# Patient Record
Sex: MALE | Race: WHITE | Employment: OTHER | URBAN - METROPOLITAN AREA
[De-identification: names, ages, dates, MRNs, and addresses within clinical notes are randomized per-mention and may not be internally consistent; named-entity substitution may affect disease eponyms.]

---

## 2017-08-18 LAB
DLCO: NORMAL
FEV1/FVC: NORMAL
FEV1: NORMAL
FVC: NORMAL
TLC: NORMAL

## 2020-01-20 LAB
ALBUMIN SERPL-MCNC: 4.4 G/DL
ALP BLD-CCNC: 75 U/L
ALT SERPL-CCNC: 32 U/L
ANION GAP SERPL CALCULATED.3IONS-SCNC: NORMAL MMOL/L
AST SERPL-CCNC: 19 U/L
BILIRUB SERPL-MCNC: 0.4 MG/DL (ref 0.1–1.4)
BUN BLDV-MCNC: 19 MG/DL
CALCIUM SERPL-MCNC: 9.4 MG/DL
CHLORIDE BLD-SCNC: 99 MMOL/L
CO2: 25 MMOL/L
CREAT SERPL-MCNC: 1.03 MG/DL
GFR CALCULATED: 78
GLUCOSE BLD-MCNC: 93 MG/DL
HCT VFR BLD CALC: 43.8 % (ref 41–53)
HEMOGLOBIN: 15 G/DL (ref 13.5–17.5)
PLATELET # BLD: 258 K/ΜL
POTASSIUM SERPL-SCNC: 4.4 MMOL/L
SODIUM BLD-SCNC: 140 MMOL/L
TOTAL PROTEIN: 7.5
WBC # BLD: 11.4 10^3/ML

## 2020-02-14 LAB
SEDIMENTATION RATE, ERYTHROCYTE: 15
T4 FREE: 8.6
TSH SERPL DL<=0.05 MIU/L-ACNC: 3.03 UIU/ML

## 2020-11-11 ASSESSMENT — ENCOUNTER SYMPTOMS
CONSTIPATION: 0
PHOTOPHOBIA: 0
EYE REDNESS: 0
SORE THROAT: 0
VOMITING: 0
NAUSEA: 0
DIARRHEA: 0
ABDOMINAL PAIN: 0
BLOOD IN STOOL: 0

## 2020-11-11 NOTE — PROGRESS NOTES
2020    Chief Complaint   Patient presents with   Lucy Mcginnis New Doctor    Referral - General     previous pulmonologist no longer practicing.  URI     possible sinus infection, started last night denies covid symptoms-lightheaded, some sob but has asthma and history of bronchitis      HPI  Ramone Camara (:  1960) is a 56 y.o. male, here for evaluation of the following medical concerns:    Asthma: Reports his previous pulmonologist retired. Used to smoke mildly. Usually does well. Reports that he has been having a flare up right now. Patient has been using his rescue inhaler and nebulizer. Reports hx of recurrent PNA and bronchitis. Feels short of breath, + cough. Reports he usually gets steroids and a zpak for this issue. GERD: Follows with GI for this issues. Protonix 40mg daily. Has not had an upper GI, but is due for a colonoscopy. Previously no issues. Headache: Has seen several specialist without relief. Dx as tension HA. No recent change. Low back pain/Neck pain: Has seen pain management, chiropractic care. Has not completed PT. Flares every few months. No red flag symptoms. 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 Reports he had a tetanus booster this year. Review of Systems   Constitutional: Positive for chills and fatigue. Negative for fever. HENT: Positive for congestion, postnasal drip and rhinorrhea. Negative for hearing loss, sneezing, sore throat and tinnitus. Eyes: Negative for photophobia and redness. Respiratory: Positive for cough, shortness of breath and wheezing. Cardiovascular: Negative for chest pain, palpitations and leg swelling. Gastrointestinal: Negative for abdominal pain, blood in stool, constipation, diarrhea, nausea and vomiting. Endocrine: Negative for polydipsia and polyuria. Genitourinary: Negative for difficulty urinating, dysuria, frequency and hematuria.    Musculoskeletal: Positive for arthralgias, back pain and myalgias. Skin: Negative for rash. Neurological: Positive for weakness and headaches. Negative for dizziness, syncope, light-headedness and numbness. Psychiatric/Behavioral: The patient is not nervous/anxious. Prior to Visit Medications    Medication Sig Taking? Authorizing Provider   montelukast (SINGULAIR) 10 MG tablet Take 10 mg by mouth nightly Yes Historical Provider, MD   pantoprazole (PROTONIX) 40 MG tablet Take 40 mg by mouth daily Yes Historical Provider, MD   albuterol sulfate HFA (VENTOLIN HFA) 108 (90 Base) MCG/ACT inhaler Inhale 2 puffs into the lungs every 6 hours as needed for Wheezing Yes Historical Provider, MD   budesonide-formoterol (SYMBICORT) 160-4.5 MCG/ACT AERO Inhale 2 puffs into the lungs 2 times daily Yes Martha Altamirano MD   azithromycin (ZITHROMAX) 250 MG tablet Take 1 tablet by mouth See Admin Instructions for 5 days 500mg on day 1 followed by 250mg on days 2 - 5 Yes Martha Altamirano MD   methylPREDNISolone (MEDROL, LEONARDO,) 4 MG tablet Take by mouth. Yes Martha Altamirano MD   Cholecalciferol 100 MCG (4000 UT) CAPS Take 4,000 Units by mouth daily Yes Martha Altamirano MD   Magnesium 400 MG CAPS Take 400 mg by mouth daily Yes Martha Altamirano MD        Allergies   Allergen Reactions    Aspirin Shortness Of Breath    Nsaids     Shellfish-Derived Products        Past Medical History:   Diagnosis Date    Asthma     GERD (gastroesophageal reflux disease)     Headache           Past Surgical History:   Procedure Laterality Date    HERNIA REPAIR  2003    KNEE ARTHROPLASTY      Left Knee Scopes       Family History   Problem Relation Age of Onset    Cancer Father         Lung Ca         There is no immunization history on file for this patient.     Health Maintenance   Topic Date Due    Hepatitis C screen  1960    HIV screen  07/06/1973    DTaP/Tdap/Td vaccine (1 - Tdap) 07/06/1977    Lipid screen  07/06/1998    Diabetes screen 07/06/1998    Shingles Vaccine (1 of 2) 07/06/2008    Colon cancer screen colonoscopy  07/06/2008    Flu vaccine (1) 09/01/2020    Hepatitis A vaccine  Aged Out    Hepatitis B vaccine  Aged Out    Hib vaccine  Aged Out    Meningococcal (ACWY) vaccine  Aged Out    Pneumococcal 0-64 years Vaccine  Aged Out       Social History     Socioeconomic History    Marital status: Single     Spouse name: Not on file    Number of children: Not on file    Years of education: Not on file    Highest education level: Not on file   Occupational History    Not on file   Social Needs    Financial resource strain: Not on file    Food insecurity     Worry: Not on file     Inability: Not on file   [employer] needs     Medical: Not on file     Non-medical: Not on file   Tobacco Use    Smoking status: Never Smoker    Smokeless tobacco: Never Used   Substance and Sexual Activity    Alcohol use: Yes     Frequency: Never     Drinks per session: 1 or 2     Binge frequency: Never    Drug use: Never    Sexual activity: Not Currently     Partners: Female   Lifestyle    Physical activity     Days per week: Not on file     Minutes per session: Not on file    Stress: Not on file   Relationships    Social connections     Talks on phone: Not on file     Gets together: Not on file     Attends Mosque service: Not on file     Active member of club or organization: Not on file     Attends meetings of clubs or organizations: Not on file     Relationship status: Not on file    Intimate partner violence     Fear of current or ex partner: Not on file     Emotionally abused: Not on file     Physically abused: Not on file     Forced sexual activity: Not on file   Other Topics Concern    Not on file   Social History Narrative    Not on file         Vitals:    11/12/20 1002   BP: 118/70   Pulse: 82   Resp: 18   Temp: 97.6 °F (36.4 °C)   TempSrc: Temporal   SpO2: 95%   Weight: 220 lb (99.8 kg)   Height: 6' 1.5\" (1.867 m) Estimated body mass index is 28.63 kg/m² as calculated from the following:    Height as of this encounter: 6' 1.5\" (1.867 m). Weight as of this encounter: 220 lb (99.8 kg). Physical Exam  Vitals signs and nursing note reviewed. Constitutional:       Appearance: He is well-developed. HENT:      Head: Normocephalic. Right Ear: External ear normal.      Left Ear: External ear normal.   Eyes:      Extraocular Movements: Extraocular movements intact. Conjunctiva/sclera: Conjunctivae normal.      Pupils: Pupils are equal, round, and reactive to light. Neck:      Musculoskeletal: Neck supple. Cardiovascular:      Rate and Rhythm: Normal rate and regular rhythm. Pulses:           Radial pulses are 2+ on the right side and 2+ on the left side. Posterior tibial pulses are 2+ on the right side and 2+ on the left side. Heart sounds: Normal heart sounds. No murmur. Pulmonary:      Effort: Pulmonary effort is normal. No respiratory distress. Breath sounds: Wheezing and rhonchi present. No decreased breath sounds or rales. Abdominal:      General: Bowel sounds are normal. There is no distension. Palpations: Abdomen is soft. Tenderness: There is no abdominal tenderness. There is no rebound. Musculoskeletal: Normal range of motion. Right lower leg: No edema. Left lower leg: No edema. Skin:     General: Skin is warm and dry. Findings: No rash. Neurological:      Mental Status: He is alert and oriented to person, place, and time. Cranial Nerves: No cranial nerve deficit. Sensory: No sensory deficit. Deep Tendon Reflexes:      Reflex Scores:       Patellar reflexes are 2+ on the right side and 2+ on the left side.   Psychiatric:         Behavior: Behavior normal.         Patient Active Problem List    Diagnosis Date Noted    Moderate persistent asthma with acute exacerbation 11/12/2020    Gastroesophageal reflux disease 11/12/2020  Overweight (BMI 25.0-29.9) 11/12/2020    Cervicalgia 11/12/2020    Chronic bilateral low back pain 11/12/2020    Vitamin D deficiency 11/12/2020    Tension headache, chronic 11/12/2020         ASSESSMENT/PLAN:  Lennox Mars was seen today for established new doctor, referral - general and uri. Diagnoses and all orders for this visit:    Establishing care with new doctor, encounter for  Comments:  Need previous med recs. Will address flu and pna shot at next apt when improved. Will obtain previuos labs. Orders:  -     Cancel: CBC Auto Differential; Future  -     Cancel: Comprehensive Metabolic Panel; Future  -     Cancel: Lipid Panel; Future  -     Cancel: TSH without Reflex; Future    Moderate persistent asthma with acute exacerbation  Comments:  Cont current meds. Treat for actue exacerbation. Orders:  -     [facility] ([facility]) - Pecalex Mathew G,DO, Pulmonary, [place]  -     budesonide-formoterol (SYMBICORT) 160-4.5 MCG/ACT AERO; Inhale 2 puffs into the lungs 2 times daily  -     azithromycin (ZITHROMAX) 250 MG tablet; Take 1 tablet by mouth See Admin Instructions for 5 days 500mg on day 1 followed by 250mg on days 2 - 5  -     methylPREDNISolone (MEDROL, LEONARDO,) 4 MG tablet; Take by mouth.  -     COVID-19 Ambulatory; Future    Gastroesophageal reflux disease, unspecified whether esophagitis present  Comments:  Stable on PPI. Follows with GI. Overweight (BMI 25.0-29. 9)  Comments:  Healthy lifestyle modifications. Vitamin D deficiency  Comments:  Start 4,000 IU of Vit D w/ Vit K2 daily. Orders:  -     Cholecalciferol 100 MCG (4000 UT) CAPS; Take 4,000 Units by mouth daily    Chronic bilateral low back pain, unspecified whether sciatica present  Comments:  Cont w/ chiropractic care. Has seen pain mngt. Will obtain previous records. Consider PT    Cervicalgia  Comments:  Cont w/ chiropractic care. Has seen pain mngt. Will obtain previous records.  Consider PT    Encounter for screening laboratory testing for COVID-19 virus  -     COVID-19 Ambulatory; Future    Chronic tension-type headache, not intractable  Comments:  Trial of Magnesium 400-600mg qhs. Orders:  -     Magnesium 400 MG CAPS; Take 400 mg by mouth daily      Health Maintenance reviewed - Flu and PNA next apt. Review previous medical records and labs. Then f/u q6 months with labs yearly. Return in about 4 weeks (around 12/10/2020) for To Discuss Labs Results, Routine Follow-up of Chronic Conditions. Educational materials and/or home exercises printed for patient's review and were included in patient instructions on his/her After Visit Summary and given to patient at the end of visit.       Counseled regarding above diagnosis, including possible risks and complications,  especially if left uncontrolled.     Counseled regarding the possible side effects, risks, benefits and alternatives to treatment; patient and/or guardianverbalizes understanding, agrees, feels comfortable with and wishes to proceed with above treatment plan.     Advised patient to call with any new medication issues, and read all Rx info from pharmacy to assure aware of all possible risks and side effects of medication before taking.     Reviewed age and gender appropriate health screening exams and vaccinations. Advised patient regarding importance of keeping up withrecommended health maintenance and to schedule as soon as possible if overdue, as this is important in assessing for undiagnosed pathology, especially cancer, as well as protecting against potentially harmful/lifethreatening disease.       Patient and/or guardian verbalizes understanding and agrees with abovecounseling, assessment and plan.     All questions answered. An  electronic signature was used to authenticatethis note.     --Jade Rod MD on 11/11/20 at 3:43 PM EST

## 2020-11-12 ENCOUNTER — OFFICE VISIT (OUTPATIENT)
Dept: PRIMARY CARE CLINIC | Age: 60
End: 2020-11-12
Payer: COMMERCIAL

## 2020-11-12 VITALS
RESPIRATION RATE: 18 BRPM | WEIGHT: 220 LBS | TEMPERATURE: 97.6 F | BODY MASS INDEX: 28.23 KG/M2 | HEIGHT: 74 IN | SYSTOLIC BLOOD PRESSURE: 118 MMHG | DIASTOLIC BLOOD PRESSURE: 70 MMHG | HEART RATE: 82 BPM | OXYGEN SATURATION: 95 %

## 2020-11-12 DIAGNOSIS — J45.41 MODERATE PERSISTENT ASTHMA WITH ACUTE EXACERBATION: ICD-10-CM

## 2020-11-12 DIAGNOSIS — Z20.822 ENCOUNTER FOR SCREENING LABORATORY TESTING FOR COVID-19 VIRUS: ICD-10-CM

## 2020-11-12 PROBLEM — E55.9 VITAMIN D DEFICIENCY: Status: ACTIVE | Noted: 2020-11-12

## 2020-11-12 PROBLEM — G44.229 TENSION HEADACHE, CHRONIC: Status: ACTIVE | Noted: 2020-11-12

## 2020-11-12 PROBLEM — M54.2 CERVICALGIA: Status: ACTIVE | Noted: 2020-11-12

## 2020-11-12 PROBLEM — E66.3 OVERWEIGHT (BMI 25.0-29.9): Status: ACTIVE | Noted: 2020-11-12

## 2020-11-12 PROBLEM — K21.9 GASTROESOPHAGEAL REFLUX DISEASE: Status: ACTIVE | Noted: 2020-11-12

## 2020-11-12 PROBLEM — G89.29 CHRONIC BILATERAL LOW BACK PAIN: Status: ACTIVE | Noted: 2020-11-12

## 2020-11-12 PROBLEM — M54.50 CHRONIC BILATERAL LOW BACK PAIN: Status: ACTIVE | Noted: 2020-11-12

## 2020-11-12 PROCEDURE — 99204 OFFICE O/P NEW MOD 45 MIN: CPT | Performed by: FAMILY MEDICINE

## 2020-11-12 RX ORDER — BUDESONIDE AND FORMOTEROL FUMARATE DIHYDRATE 160; 4.5 UG/1; UG/1
2 AEROSOL RESPIRATORY (INHALATION) 2 TIMES DAILY
COMMUNITY
End: 2020-11-12 | Stop reason: SDUPTHER

## 2020-11-12 RX ORDER — ALBUTEROL SULFATE 90 UG/1
2 AEROSOL, METERED RESPIRATORY (INHALATION) EVERY 6 HOURS PRN
COMMUNITY
End: 2020-12-10 | Stop reason: SDUPTHER

## 2020-11-12 RX ORDER — MONTELUKAST SODIUM 10 MG/1
10 TABLET ORAL NIGHTLY
COMMUNITY
End: 2020-12-10 | Stop reason: SDUPTHER

## 2020-11-12 RX ORDER — PANTOPRAZOLE SODIUM 40 MG/1
40 TABLET, DELAYED RELEASE ORAL DAILY
COMMUNITY
End: 2022-10-26 | Stop reason: SDUPTHER

## 2020-11-12 RX ORDER — METHYLPREDNISOLONE 4 MG/1
TABLET ORAL
Qty: 1 KIT | Refills: 0 | Status: SHIPPED
Start: 2020-11-12 | End: 2020-11-23 | Stop reason: ALTCHOICE

## 2020-11-12 RX ORDER — BUDESONIDE AND FORMOTEROL FUMARATE DIHYDRATE 160; 4.5 UG/1; UG/1
2 AEROSOL RESPIRATORY (INHALATION) 2 TIMES DAILY
Qty: 1 INHALER | Refills: 3 | Status: SHIPPED
Start: 2020-11-12 | End: 2020-12-10 | Stop reason: SDUPTHER

## 2020-11-12 RX ORDER — AZITHROMYCIN 250 MG/1
250 TABLET, FILM COATED ORAL SEE ADMIN INSTRUCTIONS
Qty: 6 TABLET | Refills: 0 | Status: SHIPPED | OUTPATIENT
Start: 2020-11-12 | End: 2020-11-17

## 2020-11-12 SDOH — HEALTH STABILITY: MENTAL HEALTH: HOW OFTEN DO YOU HAVE A DRINK CONTAINING ALCOHOL?: NEVER

## 2020-11-12 SDOH — HEALTH STABILITY: MENTAL HEALTH: HOW MANY STANDARD DRINKS CONTAINING ALCOHOL DO YOU HAVE ON A TYPICAL DAY?: 1 OR 2

## 2020-11-12 ASSESSMENT — PATIENT HEALTH QUESTIONNAIRE - PHQ9
SUM OF ALL RESPONSES TO PHQ9 QUESTIONS 1 & 2: 0
SUM OF ALL RESPONSES TO PHQ QUESTIONS 1-9: 0
SUM OF ALL RESPONSES TO PHQ QUESTIONS 1-9: 0
1. LITTLE INTEREST OR PLEASURE IN DOING THINGS: 0
2. FEELING DOWN, DEPRESSED OR HOPELESS: 0
SUM OF ALL RESPONSES TO PHQ QUESTIONS 1-9: 0

## 2020-11-12 ASSESSMENT — ENCOUNTER SYMPTOMS
RHINORRHEA: 1
BACK PAIN: 1
WHEEZING: 1
COUGH: 1
SHORTNESS OF BREATH: 1

## 2020-11-14 LAB
SARS-COV-2: DETECTED
SOURCE: ABNORMAL

## 2020-11-16 ENCOUNTER — APPOINTMENT (OUTPATIENT)
Dept: GENERAL RADIOLOGY | Age: 60
End: 2020-11-16
Payer: COMMERCIAL

## 2020-11-16 ENCOUNTER — HOSPITAL ENCOUNTER (EMERGENCY)
Age: 60
Discharge: HOME | End: 2020-11-17
Attending: EMERGENCY MEDICINE
Payer: COMMERCIAL

## 2020-11-16 PROCEDURE — 71045 X-RAY EXAM CHEST 1 VIEW: CPT

## 2020-11-16 PROCEDURE — 99283 EMERGENCY DEPT VISIT LOW MDM: CPT | Performed by: EMERGENCY MEDICINE

## 2020-11-16 RX ORDER — SODIUM CHLORIDE 0.9 % (FLUSH) 0.9 %
10 SYRINGE (ML) INJECTION PRN
Status: DISCONTINUED | OUTPATIENT
Start: 2020-11-16 | End: 2020-11-17 | Stop reason: HOSPADM

## 2020-11-16 ASSESSMENT — ENCOUNTER SYMPTOMS
SORE THROAT: 0
COUGH: 1
BACK PAIN: 0
VOMITING: 0
SHORTNESS OF BREATH: 1
NAUSEA: 0
SPUTUM PRODUCTION: 0
ABDOMINAL PAIN: 0
EYE REDNESS: 0
EYE PAIN: 0
EYE DISCHARGE: 0
SINUS PRESSURE: 0
DIARRHEA: 0
WHEEZING: 0
SWOLLEN GLANDS: 0

## 2020-11-17 ENCOUNTER — TELEPHONE (OUTPATIENT)
Dept: PRIMARY CARE CLINIC | Age: 60
End: 2020-11-17

## 2020-11-17 VITALS
SYSTOLIC BLOOD PRESSURE: 127 MMHG | HEART RATE: 82 BPM | DIASTOLIC BLOOD PRESSURE: 69 MMHG | TEMPERATURE: 97.9 F | BODY MASS INDEX: 27.98 KG/M2 | OXYGEN SATURATION: 95 % | WEIGHT: 218 LBS | RESPIRATION RATE: 16 BRPM | HEIGHT: 74 IN

## 2020-11-17 LAB
ANION GAP SERPL CALCULATED.3IONS-SCNC: 10 MMOL/L (ref 7–16)
ATYPICAL LYMPHOCYTE RELATIVE PERCENT: 4.3 % (ref 0–4)
BASOPHILS ABSOLUTE: 0 E9/L (ref 0–0.2)
BASOPHILS RELATIVE PERCENT: 0 % (ref 0–2)
BUN BLDV-MCNC: 20 MG/DL (ref 8–23)
CALCIUM SERPL-MCNC: 8.8 MG/DL (ref 8.6–10.2)
CHLORIDE BLD-SCNC: 95 MMOL/L (ref 98–107)
CO2: 28 MMOL/L (ref 22–29)
CREAT SERPL-MCNC: 1.2 MG/DL (ref 0.7–1.2)
D DIMER: 241 NG/ML DDU
EKG ATRIAL RATE: 77 BPM
EKG P AXIS: 64 DEGREES
EKG P-R INTERVAL: 164 MS
EKG Q-T INTERVAL: 366 MS
EKG QRS DURATION: 86 MS
EKG QTC CALCULATION (BAZETT): 414 MS
EKG R AXIS: 77 DEGREES
EKG T AXIS: 61 DEGREES
EKG VENTRICULAR RATE: 77 BPM
EOSINOPHILS ABSOLUTE: 0 E9/L (ref 0.05–0.5)
EOSINOPHILS RELATIVE PERCENT: 0 % (ref 0–6)
GFR AFRICAN AMERICAN: >60
GFR NON-AFRICAN AMERICAN: >60 ML/MIN/1.73
GLUCOSE BLD-MCNC: 97 MG/DL (ref 74–99)
HCT VFR BLD CALC: 47.2 % (ref 37–54)
HEMOGLOBIN: 15.5 G/DL (ref 12.5–16.5)
LYMPHOCYTES ABSOLUTE: 1.64 E9/L (ref 1.5–4)
LYMPHOCYTES RELATIVE PERCENT: 21.7 % (ref 20–42)
MCH RBC QN AUTO: 31.4 PG (ref 26–35)
MCHC RBC AUTO-ENTMCNC: 32.8 % (ref 32–34.5)
MCV RBC AUTO: 95.7 FL (ref 80–99.9)
MONOCYTES ABSOLUTE: 0.44 E9/L (ref 0.1–0.95)
MONOCYTES RELATIVE PERCENT: 7 % (ref 2–12)
NEUTROPHILS ABSOLUTE: 4.22 E9/L (ref 1.8–7.3)
NEUTROPHILS RELATIVE PERCENT: 67 % (ref 43–80)
NUCLEATED RED BLOOD CELLS: 0 /100 WBC
PDW BLD-RTO: 12 FL (ref 11.5–15)
PLATELET # BLD: 162 E9/L (ref 130–450)
PMV BLD AUTO: 9.2 FL (ref 7–12)
POTASSIUM SERPL-SCNC: 4.6 MMOL/L (ref 3.5–5)
PRO-BNP: 63 PG/ML (ref 0–125)
RBC # BLD: 4.93 E12/L (ref 3.8–5.8)
SODIUM BLD-SCNC: 133 MMOL/L (ref 132–146)
TROPONIN: <0.01 NG/ML (ref 0–0.03)
WBC # BLD: 6.3 E9/L (ref 4.5–11.5)

## 2020-11-17 PROCEDURE — 85378 FIBRIN DEGRADE SEMIQUANT: CPT

## 2020-11-17 PROCEDURE — 83880 ASSAY OF NATRIURETIC PEPTIDE: CPT

## 2020-11-17 PROCEDURE — 80048 BASIC METABOLIC PNL TOTAL CA: CPT

## 2020-11-17 PROCEDURE — 93005 ELECTROCARDIOGRAM TRACING: CPT | Performed by: EMERGENCY MEDICINE

## 2020-11-17 PROCEDURE — 85025 COMPLETE CBC W/AUTO DIFF WBC: CPT

## 2020-11-17 PROCEDURE — 84484 ASSAY OF TROPONIN QUANT: CPT

## 2020-11-17 PROCEDURE — 93010 ELECTROCARDIOGRAM REPORT: CPT | Performed by: INTERNAL MEDICINE

## 2020-11-17 NOTE — ED PROVIDER NOTES
breath sounds. No decreased breath sounds, wheezing or rhonchi. Abdominal:      General: Bowel sounds are normal.      Palpations: Abdomen is soft. There is no hepatomegaly or mass. Musculoskeletal: Normal range of motion. Right lower leg: He exhibits no tenderness. No edema. Left lower leg: He exhibits no tenderness. No edema. Skin:     General: Skin is warm. Capillary Refill: Capillary refill takes less than 2 seconds. Neurological:      General: No focal deficit present. Mental Status: He is alert and oriented to person, place, and time. Procedures     MDM  Number of Diagnoses or Management Options  COVID-19:   Shortness of breath:   Diagnosis management comments: Patient seen and examined. Labs and imaging were ordered. D-dimer was reassuring and was not felt a CTA of the chest was warranted. Patient never was hypoxic in the emergency department he does still complain of shortness of breath but likely related to his COVID-19. He is already on multiple medications that are appropriate for treatment for COVID-19 and at this point do not feel further intervention was warranted and patient can be discharged home with outpatient follow-up. He was given reasons to return to the emergency department and stated understanding.             --------------------------------------------- PAST HISTORY ---------------------------------------------  Past Medical History:  has a past medical history of Asthma, GERD (gastroesophageal reflux disease), and Headache. Past Surgical History:  has a past surgical history that includes Knee Arthroplasty and hernia repair (2003). Social History:  reports that he has never smoked. He has never used smokeless tobacco. He reports current alcohol use. He reports that he does not use drugs. Family History: family history includes Cancer in his father. The patients home medications have been reviewed.     Allergies: Aspirin; Nsaids; and Shellfish-derived products    -------------------------------------------------- RESULTS -------------------------------------------------  Labs:  Results for orders placed or performed during the hospital encounter of 11/16/20   CBC Auto Differential   Result Value Ref Range    WBC 6.3 4.5 - 11.5 E9/L    RBC 4.93 3.80 - 5.80 E12/L    Hemoglobin 15.5 12.5 - 16.5 g/dL    Hematocrit 47.2 37.0 - 54.0 %    MCV 95.7 80.0 - 99.9 fL    MCH 31.4 26.0 - 35.0 pg    MCHC 32.8 32.0 - 34.5 %    RDW 12.0 11.5 - 15.0 fL    Platelets 991 157 - 543 E9/L    MPV 9.2 7.0 - 12.0 fL    Neutrophils % 67.0 43.0 - 80.0 %    Lymphocytes % 21.7 20.0 - 42.0 %    Monocytes % 7.0 2.0 - 12.0 %    Eosinophils % 0.0 0.0 - 6.0 %    Basophils % 0.0 0.0 - 2.0 %    Neutrophils Absolute 4.22 1.80 - 7.30 E9/L    Lymphocytes Absolute 1.64 1.50 - 4.00 E9/L    Monocytes Absolute 0.44 0.10 - 0.95 E9/L    Eosinophils Absolute 0.00 (L) 0.05 - 0.50 E9/L    Basophils Absolute 0.00 0.00 - 0.20 E9/L    Atypical Lymphocytes Relative 4.3 (H) 0.0 - 4.0 %    nRBC 0.0 /100 WBC   Basic Metabolic Panel   Result Value Ref Range    Sodium 133 132 - 146 mmol/L    Potassium 4.6 3.5 - 5.0 mmol/L    Chloride 95 (L) 98 - 107 mmol/L    CO2 28 22 - 29 mmol/L    Anion Gap 10 7 - 16 mmol/L    Glucose 97 74 - 99 mg/dL    BUN 20 8 - 23 mg/dL    CREATININE 1.2 0.7 - 1.2 mg/dL    GFR Non-African American >60 >=60 mL/min/1.73    GFR African American >60     Calcium 8.8 8.6 - 10.2 mg/dL   Troponin   Result Value Ref Range    Troponin <0.01 0.00 - 0.03 ng/mL   Brain Natriuretic Peptide   Result Value Ref Range    Pro-BNP 63 0 - 125 pg/mL   D-Dimer, Quantitative   Result Value Ref Range    D-Dimer, Quant 241 ng/mL DDU   EKG 12 Lead   Result Value Ref Range    Ventricular Rate 77 BPM    Atrial Rate 77 BPM    P-R Interval 164 ms    QRS Duration 86 ms    Q-T Interval 366 ms    QTc Calculation (Bazett) 414 ms    P Axis 64 degrees    R Axis 77 degrees    T Axis 61 degrees       Radiology:  XR CHEST PORTABLE   Final Result   No acute process. ------------------------- NURSING NOTES AND VITALS REVIEWED ---------------------------  Date / Time Roomed:  11/16/2020 11:22 PM  ED Bed Assignment:  05/05    The nursing notes within the ED encounter and vital signs as below have been reviewed. /69   Pulse 82   Temp 97.9 °F (36.6 °C) (Temporal)   Resp 16   Ht 6' 1.5\" (1.867 m)   Wt 218 lb (98.9 kg)   SpO2 95%   BMI 28.37 kg/m²   Oxygen Saturation Interpretation: Normal      ------------------------------------------ PROGRESS NOTES   I have spoken with the patient and discussed todays results, in addition to providing specific details for the plan of care and counseling regarding the diagnosis and prognosis. Their questions are answered at this time and they are agreeable with the plan. I discussed at length with them reasons for immediate return here for re evaluation. They will followup with their primary care physician by calling their office tomorrow. --------------------------------- ADDITIONAL PROVIDER NOTES ---------------------------------  At this time the patient is without objective evidence of an acute process requiring hospitalization or inpatient management. They have remained hemodynamically stable throughout their entire ED visit and are stable for discharge with outpatient follow-up. The plan has been discussed in detail and they are aware of the specific conditions for emergent return, as well as the importance of follow-up. Discharge Medication List as of 11/17/2020  1:46 AM          Diagnosis:  1. COVID-19    2. Shortness of breath        Disposition:  Patient's disposition: Discharge to home  Patient's condition is stable.            Joseph Armenta DO  11/17/20 0857

## 2020-11-17 NOTE — ED NOTES
Pt was able to ambulate around the nurses station with a sat of 93-95%.  Pt returned to bed and stated that he was \"wiped out from the walk\"     Iwona Moss RN  11/17/20 9833

## 2020-11-17 NOTE — TELEPHONE ENCOUNTER
Patient asking if you can call him to discuss his symptoms further.  He went to the ER last night and per patient they did absolutely nothing for him

## 2020-11-18 ENCOUNTER — CARE COORDINATION (OUTPATIENT)
Dept: CARE COORDINATION | Age: 60
End: 2020-11-18

## 2020-11-18 NOTE — CARE COORDINATION
ACM attempted to contact patient as a follow up to his ER visit on 11/16/2020. ACM left a HIPAA compliant voice message with contact information asking patient to return the call.      PLAN  Continue to attempt to contact patient

## 2020-11-19 ENCOUNTER — CARE COORDINATION (OUTPATIENT)
Dept: CARE COORDINATION | Age: 60
End: 2020-11-19

## 2020-11-19 RX ORDER — ALBUTEROL SULFATE 2.5 MG/3ML
2.5 SOLUTION RESPIRATORY (INHALATION) EVERY 6 HOURS PRN
COMMUNITY
End: 2020-11-19 | Stop reason: SDUPTHER

## 2020-11-19 RX ORDER — ALBUTEROL SULFATE 2.5 MG/3ML
2.5 SOLUTION RESPIRATORY (INHALATION) EVERY 6 HOURS PRN
Qty: 120 EACH | Refills: 3 | Status: SHIPPED
Start: 2020-11-19 | End: 2020-12-10 | Stop reason: SDUPTHER

## 2020-11-19 NOTE — CARE COORDINATION
ACM's second attempt to contact patient was unsuccessful. ACM left a voice message with call back information asking for a return call.      PLAN:  D/T unable to contact patient, ACM will resolve COVID 19 Monitoring episode and remove name from the care team.

## 2020-11-23 ENCOUNTER — TELEPHONE (OUTPATIENT)
Dept: PRIMARY CARE CLINIC | Age: 60
End: 2020-11-23

## 2020-11-23 RX ORDER — METHYLPREDNISOLONE 4 MG/1
TABLET ORAL
Qty: 1 KIT | Refills: 0 | Status: SHIPPED
Start: 2020-11-23 | End: 2020-12-10

## 2020-11-23 NOTE — TELEPHONE ENCOUNTER
Patient did go to ER as advised last week. He finished medrol damaris last Monday & is using his nebulizer. His breathing is about 50% better but wondering if you could call in another round of steroids because the ER did not prescribe anything.

## 2020-12-02 ENCOUNTER — TELEPHONE (OUTPATIENT)
Dept: PRIMARY CARE CLINIC | Age: 60
End: 2020-12-02

## 2020-12-04 ENCOUNTER — HOSPITAL ENCOUNTER (OUTPATIENT)
Dept: GENERAL RADIOLOGY | Age: 60
Discharge: HOME | End: 2020-12-06
Payer: COMMERCIAL

## 2020-12-04 ENCOUNTER — HOSPITAL ENCOUNTER (OUTPATIENT)
Age: 60
Discharge: HOME | End: 2020-12-06
Payer: COMMERCIAL

## 2020-12-04 PROCEDURE — 71046 X-RAY EXAM CHEST 2 VIEWS: CPT

## 2020-12-05 ENCOUNTER — HOSPITAL ENCOUNTER (OUTPATIENT)
Dept: CT IMAGING | Age: 60
Discharge: HOME | End: 2020-12-07
Payer: COMMERCIAL

## 2020-12-05 PROCEDURE — 71250 CT THORAX DX C-: CPT

## 2020-12-09 PROBLEM — J45.40 MODERATE PERSISTENT ASTHMA WITHOUT COMPLICATION: Status: ACTIVE | Noted: 2020-11-12

## 2020-12-09 ASSESSMENT — ENCOUNTER SYMPTOMS
VOMITING: 0
NAUSEA: 0
CONSTIPATION: 0
RHINORRHEA: 0
WHEEZING: 0
DIARRHEA: 0
SORE THROAT: 0

## 2020-12-09 NOTE — PROGRESS NOTES
Negative for rash. Neurological: Positive for numbness and headaches. Negative for light-headedness. Past Medical History:   Diagnosis Date    Asthma     GERD (gastroesophageal reflux disease)     Headache        Prior to Visit Medications    Medication Sig Taking? Authorizing Provider   albuterol (PROVENTIL) (2.5 MG/3ML) 0.083% nebulizer solution Take 3 mLs by nebulization every 6 hours as needed for Wheezing Yes Gosia Fountain MD   montelukast (SINGULAIR) 10 MG tablet Take 1 tablet by mouth nightly Yes Gosia Fountain MD   albuterol sulfate HFA (VENTOLIN HFA) 108 (90 Base) MCG/ACT inhaler Inhale 2 puffs into the lungs every 6 hours as needed for Wheezing or Shortness of Breath Yes Gosia Fountain MD   budesonide-formoterol (SYMBICORT) 160-4.5 MCG/ACT AERO Inhale 2 puffs into the lungs 2 times daily Yes Gosia Fountain MD   pantoprazole (PROTONIX) 40 MG tablet Take 40 mg by mouth daily Yes Historical Provider, MD   Cholecalciferol 100 MCG (4000 UT) CAPS Take 4,000 Units by mouth daily Yes Gosia Fountain MD   Magnesium 400 MG CAPS Take 400 mg by mouth daily Yes Gosia Fountain MD        Allergies   Allergen Reactions    Aspirin Shortness Of Breath    Nsaids     Shellfish-Derived Products        Social History     Tobacco Use    Smoking status: Never Smoker    Smokeless tobacco: Never Used   Substance Use Topics    Alcohol use: Yes     Frequency: Never     Drinks per session: 1 or 2     Binge frequency: Never           Vitals:    12/10/20 0904   BP: 114/78   Pulse: 64   Resp: 20   Temp: 97.5 °F (36.4 °C)   TempSrc: Temporal   SpO2: 97%   Weight: 211 lb (95.7 kg)   Height: 6' 1.5\" (1.867 m)     Estimated body mass index is 27.46 kg/m² as calculated from the following:    Height as of this encounter: 6' 1.5\" (1.867 m). Weight as of this encounter: 211 lb (95.7 kg). Physical Exam  Constitutional:       Appearance: He is well-developed. HENT:      Head: Normocephalic.    Eyes: Extraocular Movements: Extraocular movements intact. Conjunctiva/sclera: Conjunctivae normal.   Cardiovascular:      Rate and Rhythm: Normal rate and regular rhythm. Heart sounds: Normal heart sounds. No murmur. Pulmonary:      Effort: Pulmonary effort is normal.      Breath sounds: Normal breath sounds. No wheezing or rales. Abdominal:      General: Bowel sounds are normal.      Palpations: Abdomen is soft. Tenderness: There is no abdominal tenderness. Musculoskeletal:      Right lower leg: No edema. Left lower leg: No edema. Skin:     Findings: No rash. Neurological:      Mental Status: He is alert. Comments: Cranial nerves grossly intact   Psychiatric:         Mood and Affect: Mood normal.         Judgment: Judgment normal.         ASSESSMENT/PLAN:  Aparna Lal was seen today for asthma. Diagnoses and all orders for this visit:    Chronic tension-type headache, not intractable  Comments:  Stable. No issues. Mild residual HA from COVID    Overweight (BMI 25.0-29. 9)  Comments:  Had 20lb weight loss with COVID, has put back on 10 lbs. Pt advied on health lifestyle. Moderate persistent asthma without complication  Comments:  Using controllers appropriately. Increased use of rescue inhaler 2ndary to Alessandro. Pt to establish with pulm. Gastroesophageal reflux disease, unspecified whether esophagitis present  Comments: Worse due to COVID. Cont PPI, add H2 blocker. Pt to f/u w/ GI if symptoms persist beyond 1 month or with any worsening. Chronic bilateral low back pain, unspecified whether sciatica present  Comments:  Pt to see neurology for add workup of LE neuropathy. Stable. Cervicalgia  Comments:  Stable. 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 PT to see neurology. Pneumonia due to COVID-19 virus  -     EKG 12 lead; Future  -     XR CHEST (2 VW); Future  -     CBC WITH AUTO DIFFERENTIAL; Future  -     COMPREHENSIVE METABOLIC PANEL;  Future  -     D-DIMER, QUANTITATIVE;

## 2020-12-10 ENCOUNTER — OFFICE VISIT (OUTPATIENT)
Dept: PRIMARY CARE CLINIC | Age: 60
End: 2020-12-10
Payer: COMMERCIAL

## 2020-12-10 VITALS
WEIGHT: 211 LBS | OXYGEN SATURATION: 97 % | HEART RATE: 64 BPM | TEMPERATURE: 97.5 F | HEIGHT: 74 IN | RESPIRATION RATE: 20 BRPM | DIASTOLIC BLOOD PRESSURE: 78 MMHG | BODY MASS INDEX: 27.08 KG/M2 | SYSTOLIC BLOOD PRESSURE: 114 MMHG

## 2020-12-10 PROCEDURE — 99214 OFFICE O/P EST MOD 30 MIN: CPT | Performed by: FAMILY MEDICINE

## 2020-12-10 RX ORDER — MONTELUKAST SODIUM 10 MG/1
10 TABLET ORAL NIGHTLY
Qty: 90 TABLET | Refills: 1 | Status: SHIPPED
Start: 2020-12-10 | End: 2021-11-22 | Stop reason: ALTCHOICE

## 2020-12-10 RX ORDER — ALBUTEROL SULFATE 90 UG/1
2 AEROSOL, METERED RESPIRATORY (INHALATION) EVERY 6 HOURS PRN
Qty: 2 INHALER | Refills: 3 | Status: SHIPPED
Start: 2020-12-10 | End: 2021-07-12

## 2020-12-10 RX ORDER — ALBUTEROL SULFATE 2.5 MG/3ML
2.5 SOLUTION RESPIRATORY (INHALATION) EVERY 6 HOURS PRN
Qty: 120 EACH | Refills: 3 | Status: SHIPPED | OUTPATIENT
Start: 2020-12-10

## 2020-12-10 RX ORDER — BUDESONIDE AND FORMOTEROL FUMARATE DIHYDRATE 160; 4.5 UG/1; UG/1
2 AEROSOL RESPIRATORY (INHALATION) 2 TIMES DAILY
Qty: 4 INHALER | Refills: 2 | Status: SHIPPED
Start: 2020-12-10 | End: 2020-12-13 | Stop reason: SDUPTHER

## 2020-12-10 ASSESSMENT — ENCOUNTER SYMPTOMS
SHORTNESS OF BREATH: 1
ABDOMINAL PAIN: 1
CHEST TIGHTNESS: 1
COUGH: 1

## 2020-12-13 RX ORDER — BUDESONIDE AND FORMOTEROL FUMARATE DIHYDRATE 160; 4.5 UG/1; UG/1
2 AEROSOL RESPIRATORY (INHALATION) 2 TIMES DAILY
Qty: 4 INHALER | Refills: 2 | Status: SHIPPED
Start: 2020-12-13 | End: 2021-08-23 | Stop reason: SDUPTHER

## 2020-12-28 ENCOUNTER — OFFICE VISIT (OUTPATIENT)
Dept: PRIMARY CARE CLINIC | Age: 60
End: 2020-12-28
Payer: COMMERCIAL

## 2020-12-28 VITALS — HEIGHT: 74 IN | BODY MASS INDEX: 27.08 KG/M2 | WEIGHT: 211 LBS | RESPIRATION RATE: 18 BRPM

## 2020-12-28 DIAGNOSIS — J12.82 PNEUMONIA DUE TO COVID-19 VIRUS: ICD-10-CM

## 2020-12-28 DIAGNOSIS — U07.1 PNEUMONIA DUE TO COVID-19 VIRUS: ICD-10-CM

## 2020-12-28 DIAGNOSIS — U07.1 COVID-19 VIRUS INFECTION: ICD-10-CM

## 2020-12-28 LAB
ALBUMIN SERPL-MCNC: 4.3 G/DL (ref 3.5–5.2)
ALP BLD-CCNC: 68 U/L (ref 40–129)
ALT SERPL-CCNC: 38 U/L (ref 0–40)
ANION GAP SERPL CALCULATED.3IONS-SCNC: 16 MMOL/L (ref 7–16)
AST SERPL-CCNC: 27 U/L (ref 0–39)
BASOPHILS ABSOLUTE: 0.05 E9/L (ref 0–0.2)
BASOPHILS RELATIVE PERCENT: 0.7 % (ref 0–2)
BILIRUB SERPL-MCNC: 0.6 MG/DL (ref 0–1.2)
BUN BLDV-MCNC: 15 MG/DL (ref 8–23)
CALCIUM SERPL-MCNC: 9.7 MG/DL (ref 8.6–10.2)
CHLORIDE BLD-SCNC: 102 MMOL/L (ref 98–107)
CO2: 22 MMOL/L (ref 22–29)
CREAT SERPL-MCNC: 1.1 MG/DL (ref 0.7–1.2)
D DIMER: 319 NG/ML DDU
EOSINOPHILS ABSOLUTE: 0.18 E9/L (ref 0.05–0.5)
EOSINOPHILS RELATIVE PERCENT: 2.4 % (ref 0–6)
GFR AFRICAN AMERICAN: >60
GFR NON-AFRICAN AMERICAN: >60 ML/MIN/1.73
GLUCOSE BLD-MCNC: 110 MG/DL (ref 74–99)
HCT VFR BLD CALC: 45 % (ref 37–54)
HEMOGLOBIN: 14.9 G/DL (ref 12.5–16.5)
IMMATURE GRANULOCYTES #: 0.01 E9/L
IMMATURE GRANULOCYTES %: 0.1 % (ref 0–5)
LACTATE DEHYDROGENASE: 164 U/L (ref 135–225)
LYMPHOCYTES ABSOLUTE: 2.77 E9/L (ref 1.5–4)
LYMPHOCYTES RELATIVE PERCENT: 37.3 % (ref 20–42)
MCH RBC QN AUTO: 31 PG (ref 26–35)
MCHC RBC AUTO-ENTMCNC: 33.1 % (ref 32–34.5)
MCV RBC AUTO: 93.8 FL (ref 80–99.9)
MONOCYTES ABSOLUTE: 0.56 E9/L (ref 0.1–0.95)
MONOCYTES RELATIVE PERCENT: 7.5 % (ref 2–12)
NEUTROPHILS ABSOLUTE: 3.85 E9/L (ref 1.8–7.3)
NEUTROPHILS RELATIVE PERCENT: 52 % (ref 43–80)
PDW BLD-RTO: 12.4 FL (ref 11.5–15)
PLATELET # BLD: 239 E9/L (ref 130–450)
PMV BLD AUTO: 10.2 FL (ref 7–12)
POTASSIUM SERPL-SCNC: 4.4 MMOL/L (ref 3.5–5)
RBC # BLD: 4.8 E12/L (ref 3.8–5.8)
SODIUM BLD-SCNC: 140 MMOL/L (ref 132–146)
TOTAL PROTEIN: 7.5 G/DL (ref 6.4–8.3)
WBC # BLD: 7.4 E9/L (ref 4.5–11.5)

## 2020-12-28 PROCEDURE — 99213 OFFICE O/P EST LOW 20 MIN: CPT | Performed by: FAMILY MEDICINE

## 2020-12-28 PROCEDURE — 93000 ELECTROCARDIOGRAM COMPLETE: CPT | Performed by: FAMILY MEDICINE

## 2020-12-28 RX ORDER — PREDNISONE 10 MG/1
TABLET ORAL
Qty: 21 TABLET | Refills: 0 | Status: SHIPPED | OUTPATIENT
Start: 2020-12-28 | End: 2021-01-07

## 2020-12-28 RX ORDER — DOXYCYCLINE HYCLATE 100 MG
100 TABLET ORAL 2 TIMES DAILY
Qty: 14 TABLET | Refills: 0 | Status: SHIPPED
Start: 2020-12-28 | End: 2022-04-07 | Stop reason: SDUPTHER

## 2020-12-28 RX ORDER — TRIAMCINOLONE ACETONIDE 1 MG/G
CREAM TOPICAL
Qty: 45 G | Refills: 0 | Status: SHIPPED | OUTPATIENT
Start: 2020-12-28

## 2020-12-28 NOTE — PROGRESS NOTES
20  [facility]anches[facility] : 1960 Sex: male  Age: 56 y.o. Chief Complaint   Patient presents with    Shortness of Breath     burning, since shoveling snow       HPI: : URI  The patient states that they have had rhinorrhea, cough, congestion for the last 3 days. Cough is productive with sputum. Reports burning with deep breaths. Positive for nasal discharge. The patient has had general malaise. No fever or chills but has felt warm at times. Patient does admit to chest discomfort with coughing. + dyspnea. No vomiting or diarrhea. The patient presents for evaluation. Using rescue inhaler more often. ROS:  Const: Positives and pertinent negatives as per HPI. All others reviewed and are negative. Current Outpatient Medications:     doxycycline hyclate (VIBRA-TABS) 100 MG tablet, Take 1 tablet by mouth 2 times daily for 7 days, Disp: 14 tablet, Rfl: 0    predniSONE (DELTASONE) 10 MG tablet, Take 4 tablets by mouth daily for 2 days, THEN 3 tablets daily for 2 days, THEN 2 tablets daily for 2 days, THEN 1 tablet daily for 2 days, THEN 0.5 tablets daily for 2 days. , Disp: 21 tablet, Rfl: 0    triamcinolone (KENALOG) 0.1 % cream, Apply topically 2 times daily for up to 2 weeks at a time. , Disp: 45 g, Rfl: 0    budesonide-formoterol (SYMBICORT) 160-4.5 MCG/ACT AERO, Inhale 2 puffs into the lungs 2 times daily, Disp: 4 Inhaler, Rfl: 2    albuterol (PROVENTIL) (2.5 MG/3ML) 0.083% nebulizer solution, Take 3 mLs by nebulization every 6 hours as needed for Wheezing, Disp: 120 each, Rfl: 3    montelukast (SINGULAIR) 10 MG tablet, Take 1 tablet by mouth nightly, Disp: 90 tablet, Rfl: 1    albuterol sulfate HFA (VENTOLIN HFA) 108 (90 Base) MCG/ACT inhaler, Inhale 2 puffs into the lungs every 6 hours as needed for Wheezing or Shortness of Breath, Disp: 2 Inhaler, Rfl: 3    pantoprazole (PROTONIX) 40 MG tablet, Take 40 mg by mouth daily, Disp: , Rfl:     Cholecalciferol 100 MCG (4000 UT) CAPS, Take 4,000 Units by mouth daily, Disp: 90 capsule, Rfl: 3    Magnesium 400 MG CAPS, Take 400 mg by mouth daily, Disp: 90 capsule, Rfl: 3  Allergies   Allergen Reactions    Aspirin Shortness Of Breath    Nsaids     Shellfish-Derived Products        Past Medical History:   Diagnosis Date    Asthma     GERD (gastroesophageal reflux disease)     Headache      Past Surgical History:   Procedure Laterality Date    HERNIA REPAIR  2003    KNEE ARTHROPLASTY      Left Knee Scopes     Family History   Problem Relation Age of Onset    Cancer Father         Lung Ca     Social History     Socioeconomic History    Marital status: Single     Spouse name: Not on file    Number of children: Not on file    Years of education: Not on file    Highest education level: Not on file   Occupational History    Not on file   Social Needs    Financial resource strain: Not on file    Food insecurity     Worry: Not on file     Inability: Not on file    Transportation needs     Medical: Not on file     Non-medical: Not on file   Tobacco Use    Smoking status: Never Smoker    Smokeless tobacco: Never Used   Substance and Sexual Activity    Alcohol use: Yes     Frequency: Never     Drinks per session: 1 or 2     Binge frequency: Never    Drug use: Never    Sexual activity: Not Currently     Partners: Female   Lifestyle    Physical activity     Days per week: Not on file     Minutes per session: Not on file    Stress: Not on file   Relationships    Social connections     Talks on phone: Not on file     Gets together: Not on file     Attends [employer] service: Not on file     Active member of club or organization: Not on file     Attends meetings of clubs or organizations: Not on file     Relationship status: Not on file    Intimate partner violence     Fear of current or ex partner: Not on file     Emotionally abused: Not on file     Physically abused: Not on file     Forced sexual activity: Not on file   Other Topics Concern  Not on file   Social History Narrative    Not on file         Vitals:    12/28/20 1014   Resp: 18   Weight: 211 lb (95.7 kg)   Height: 6' 1.5\" (1.867 m)       Exam:  Physical Exam  Constitutional:       Appearance: He is well-developed. HENT:      Head: Normocephalic. Eyes:      Conjunctiva/sclera: Conjunctivae normal.      Pupils: Pupils are equal, round, and reactive to light. Cardiovascular:      Rate and Rhythm: Normal rate and regular rhythm. Heart sounds: Normal heart sounds. No murmur. Pulmonary:      Effort: Pulmonary effort is normal.      Breath sounds: Examination of the right-lower field reveals wheezing. Wheezing present. No rales. Abdominal:      General: Bowel sounds are normal.      Palpations: Abdomen is soft. Tenderness: There is no abdominal tenderness. Neurological:      Mental Status: He is alert. Comments: Cranial nerves grossly intact         Assessment and Plan:  Chaitanya Mirza was seen today for shortness of breath. Diagnoses and all orders for this visit:    Pneumonia due to COVID-19 virus  -     EKG 12 lead  -     doxycycline hyclate (VIBRA-TABS) 100 MG tablet; Take 1 tablet by mouth 2 times daily for 7 days  -     predniSONE (DELTASONE) 10 MG tablet; Take 4 tablets by mouth daily for 2 days, THEN 3 tablets daily for 2 days, THEN 2 tablets daily for 2 days, THEN 1 tablet daily for 2 days, THEN 0.5 tablets daily for 2 days. -     triamcinolone (KENALOG) 0.1 % cream; Apply topically 2 times daily for up to 2 weeks at a time. COVID-19 virus infection  -     EKG 12 lead  -     doxycycline hyclate (VIBRA-TABS) 100 MG tablet; Take 1 tablet by mouth 2 times daily for 7 days  -     predniSONE (DELTASONE) 10 MG tablet; Take 4 tablets by mouth daily for 2 days, THEN 3 tablets daily for 2 days, THEN 2 tablets daily for 2 days, THEN 1 tablet daily for 2 days, THEN 0.5 tablets daily for 2 days.   -     triamcinolone (KENALOG) 0.1 % cream; Apply topically 2 times daily for up to 2 weeks at a time. Concerns for worsening inflammation or new pneumonia based on lung exam. Abx and steroids. Cont OTC supplements. To ER with worsening issues. EKG and Chest XR reviewed. Pt to call in 1 week to notify us of how he is doing. Pt to schedule with pulm. Reports main issues today is cough, burning, shortness of breath with exertion, and memory issues. Patient is a long-hauler of COVID     Has an upcoming apt with neurology. Return in about 4 weeks (around 1/25/2021) for Follow-up Appointment From Today's Visit. The above treatment regimen was discussed at length and all questions in regards to such were answered. Antibiotics were reviewed including common side effects and rare side effects including but not limited to C. diff infection. Patient is to proceed to the emergency department with any worsening symptoms.     Seen By:   Leona Bains MD

## 2020-12-30 ENCOUNTER — TELEPHONE (OUTPATIENT)
Dept: PRIMARY CARE CLINIC | Age: 60
End: 2020-12-30

## 2020-12-30 NOTE — TELEPHONE ENCOUNTER
Pt called stating that he spoke to you and that along with the lab order, should be a CT order. I don't see an order for the CT. If this is correct, will you kindly place the CT order? Thank you.

## 2020-12-31 DIAGNOSIS — R79.89 ELEVATED D-DIMER: ICD-10-CM

## 2020-12-31 LAB — D DIMER: 206 NG/ML DDU

## 2020-12-31 NOTE — TELEPHONE ENCOUNTER
Please advise the patient that we were going to repeat his blood work first. Please have the patient complete his D-dimer order today

## 2021-01-04 ENCOUNTER — TELEPHONE (OUTPATIENT)
Dept: PRIMARY CARE CLINIC | Age: 61
End: 2021-01-04

## 2021-01-04 NOTE — TELEPHONE ENCOUNTER
Patient calling the group Dr. Gianna Nolan is in is not taking appts at this time asking for a new referral or recommendation for pulmonary.

## 2021-01-20 ENCOUNTER — OFFICE VISIT (OUTPATIENT)
Dept: PRIMARY CARE CLINIC | Age: 61
End: 2021-01-20
Payer: COMMERCIAL

## 2021-01-20 VITALS
WEIGHT: 211 LBS | RESPIRATION RATE: 20 BRPM | OXYGEN SATURATION: 97 % | SYSTOLIC BLOOD PRESSURE: 130 MMHG | DIASTOLIC BLOOD PRESSURE: 66 MMHG | HEART RATE: 67 BPM | BODY MASS INDEX: 27.08 KG/M2 | TEMPERATURE: 97.2 F | HEIGHT: 74 IN

## 2021-01-20 DIAGNOSIS — U07.1 COVID-19 VIRUS INFECTION: ICD-10-CM

## 2021-01-20 DIAGNOSIS — R42 DIZZINESS: ICD-10-CM

## 2021-01-20 DIAGNOSIS — U07.1 COVID-19 VIRUS INFECTION: Primary | ICD-10-CM

## 2021-01-20 DIAGNOSIS — R40.0 SOMNOLENCE: ICD-10-CM

## 2021-01-20 DIAGNOSIS — R53.83 FATIGUE, UNSPECIFIED TYPE: ICD-10-CM

## 2021-01-20 LAB
C-REACTIVE PROTEIN: 0.3 MG/DL (ref 0–0.4)
T4 FREE: 1.26 NG/DL (ref 0.93–1.7)
TSH SERPL DL<=0.05 MIU/L-ACNC: 1.59 UIU/ML (ref 0.27–4.2)

## 2021-01-20 PROCEDURE — 99213 OFFICE O/P EST LOW 20 MIN: CPT | Performed by: FAMILY MEDICINE

## 2021-01-20 RX ORDER — IVERMECTIN 3 MG/1
200 TABLET ORAL
Qty: 13 TABLET | Refills: 0 | Status: SHIPPED | OUTPATIENT
Start: 2021-01-20 | End: 2021-01-23

## 2021-01-20 ASSESSMENT — ENCOUNTER SYMPTOMS
WHEEZING: 0
DIARRHEA: 0
SORE THROAT: 0
SHORTNESS OF BREATH: 0
NAUSEA: 0
CONSTIPATION: 0
RHINORRHEA: 0
ABDOMINAL PAIN: 0
VOMITING: 0

## 2021-01-20 ASSESSMENT — PATIENT HEALTH QUESTIONNAIRE - PHQ9
SUM OF ALL RESPONSES TO PHQ9 QUESTIONS 1 & 2: 1
SUM OF ALL RESPONSES TO PHQ QUESTIONS 1-9: 1

## 2021-01-20 NOTE — PROGRESS NOTES
2021     Chief Complaint   Patient presents with    Fatigue     sleeping 12-16 hours over the weekend    Dizziness     problem with going up stairs     MARISOL Horvath (:  1960) is a 56 y.o. male, here for evaluation of the following medical concerns:    Patient has ongoing issues with the sequela of COVID-19. Patient reports his main issues currently are fatigue and at time dizziness. Reports sleeping upwards of 12-16 hours a day. Patient is still working. Feels like when he puts his head down to relax he can fall asleep. At his last appointment he was still having significant dyspnea which has improved slightly. No sig. Cough. But is coughing up some mucus at times. Cold air seems to be hard on him from a breathing standpoint. Patient is using his rescue inhaler 2x a day along with Symbicort and Singulair. Reports his rescue inhaler doesn't seem to help much. Patient has had symptoms for >1 month. Labs have been normal. XR of chest has shown improvement. Patient had a normal EKG at his last apt. Saw neurology for headache and dizziness issues. Was given a new muscle relaxer. Made him very tired. Can not recall the exact dx he was given for his headaches and dizziness. Patient was essentially advise to work on posture. No labs or testing was ordered. Review of Systems   Constitutional: Negative for chills and fever. HENT: Negative for congestion, rhinorrhea and sore throat. Respiratory: Negative for shortness of breath and wheezing. Cardiovascular: Negative for chest pain and leg swelling. Gastrointestinal: Negative for abdominal pain, constipation, diarrhea, nausea and vomiting. Skin: Negative for rash. Neurological: Negative for light-headedness and headaches. Past Medical History:   Diagnosis Date    Asthma     GERD (gastroesophageal reflux disease)     Headache        Prior to Visit Medications    Medication Sig Taking?  Authorizing Provider ivermectin 3 MG tablet Take 6.5 tablets by mouth every 48 hours for 2 doses Take 1 dose on day 1 and an additional dose on day 3 with a small snack and glass of water. Yes Armani Howard MD   triamcinolone (KENALOG) 0.1 % cream Apply topically 2 times daily for up to 2 weeks at a time. Yes Armani Howard MD   budesonide-formoterol (SYMBICORT) 160-4.5 MCG/ACT AERO Inhale 2 puffs into the lungs 2 times daily Yes Armani Howard MD   albuterol (PROVENTIL) (2.5 MG/3ML) 0.083% nebulizer solution Take 3 mLs by nebulization every 6 hours as needed for Wheezing Yes Armani Howard MD   montelukast (SINGULAIR) 10 MG tablet Take 1 tablet by mouth nightly Yes Armani Howard MD   albuterol sulfate HFA (VENTOLIN HFA) 108 (90 Base) MCG/ACT inhaler Inhale 2 puffs into the lungs every 6 hours as needed for Wheezing or Shortness of Breath Yes Armani Howard MD   pantoprazole (PROTONIX) 40 MG tablet Take 40 mg by mouth daily Yes Ruma Gonzáles MD   Cholecalciferol 100 MCG (4000 UT) CAPS Take 4,000 Units by mouth daily Yes Armani Howard MD   Magnesium 400 MG CAPS Take 400 mg by mouth daily Yes Armani Howard MD        Allergies   Allergen Reactions    Aspirin Shortness Of Breath    Nsaids     Shellfish-Derived Products        Social History     Tobacco Use    Smoking status: Never Smoker    Smokeless tobacco: Never Used   Substance Use Topics    Alcohol use: Yes     Frequency: Never     Drinks per session: 1 or 2     Binge frequency: Never           Vitals:    01/20/21 1401   BP: 130/66   Pulse: 67   Resp: 20   Temp: 97.2 °F (36.2 °C)   TempSrc: Temporal   SpO2: 97%   Weight: 211 lb (95.7 kg)   Height: 6' 1.5\" (1.867 m)     Estimated body mass index is 27.46 kg/m² as calculated from the following:    Height as of this encounter: 6' 1.5\" (1.867 m). Weight as of this encounter: 211 lb (95.7 kg). Physical Exam  Constitutional:       Appearance: He is well-developed.    HENT: Head: Normocephalic. Eyes:      Conjunctiva/sclera: Conjunctivae normal.      Pupils: Pupils are equal, round, and reactive to light. Cardiovascular:      Rate and Rhythm: Normal rate and regular rhythm. Heart sounds: Normal heart sounds. No murmur. Pulmonary:      Effort: Pulmonary effort is normal.      Breath sounds: Normal breath sounds. No wheezing or rales. Abdominal:      General: Bowel sounds are normal.      Palpations: Abdomen is soft. Tenderness: There is no abdominal tenderness. Neurological:      Mental Status: He is alert. Comments: Cranial nerves grossly intact         ASSESSMENT/PLAN:  Chucky Pang was seen today for fatigue and dizziness. Diagnoses and all orders for this visit:    COVID-19 virus infection  -     ivermectin 3 MG tablet; Take 6.5 tablets by mouth every 48 hours for 2 doses Take 1 dose on day 1 and an additional dose on day 3 with a small snack and glass of water. -     Holter Monitor 48 Hour; Future  -     TSH; Future  -     T4, FREE; Future  -     C-Reactive Protein; Future    COVID-19 Protocol based of [facility]ABHAK[facility] COVID-19 Protocol  [email or web address] or [email or web address]    1) Ivermectin: One dose on day 1 and one dose on day 3. ~0.2mg/kg per dose  a. Check home medications list for interactions with ivermectin on iClinical, discuss this issue with you family doctor, pharmacist, or prescriber. Do not take if you are taking immune modulating medications or anti-rejection medications such as cyclosporin, tacrolimus, anti-retroviral medications or certain anti-fungal medications. b. Reather Dixons not take if you have any allergy to ivermectin or its components. c. Do not take if you have any issues with your blood brain barrier such as a recent stroke. d. Do not consume alcohol while taking.   e. Do not take if breast feeding or currently pregnant. f. Take 1 hour prior to eating or at least 2 hours after last eating. Take with a full glass of water. g. Medication is used as off-label. There is ongoing research into the use of Ivermectin in COVID-19 active patient and sequela/longhauler due to its postulated anti-viral and anti-inflammatory effects. Patient is aware of risk and possible benefit of taking such medication. Patient was provided a handout on the side effects of such medication. Patient is aware that this medication is not approved by the FDA for the treatment of COVID19 but is an approved medication for other indications. All questions in regard to the above were answered. With shared decision making the patient would like to proceed with a trial of the above medication. Patient was advised to proceed to the ER with any allergic reaction to the above medications. ED sooner if symptoms worsen or change. ED immediately with high fevers, progressive SOB, dyspnea, CP, calf pain/swelling, signs of dehydration, (decreased urinary output or inability to tolerate PO). Labs as noted above. After the results of the below testing we will determine if the patient needs to see a cardiologist or pulmonologist.     Dizziness  -     Holter Monitor 48 Hour; Future  Dizzy and reported palpitations with movement from a squatting position to standing position or with significant exertions. EKG in the past was normal. Check Holter. No specific concerns brought up by neurology per patient. No note from neurology was received. Fatigue, unspecified type  -     Home Sleep Study; Future  -     TSH; Future  -     T4, FREE; Future  -     C-Reactive Protein; Future    Somnolence  -     Home Sleep Study; Future  -     TSH; Future  -     T4, FREE; Future  -     C-Reactive Protein; Future    Concerns for COVID sequela vs. SHAMEKA vs. Thyroid dysfunction. Exam as noted above is benign. Consider PFTs    Return in about 2 weeks (around 2/3/2021).

## 2021-01-26 ENCOUNTER — HOSPITAL ENCOUNTER (OUTPATIENT)
Dept: SLEEP CENTER | Age: 61
Discharge: HOME | End: 2021-01-26
Payer: COMMERCIAL

## 2021-01-26 DIAGNOSIS — R42 DIZZINESS: ICD-10-CM

## 2021-01-26 DIAGNOSIS — U07.1 COVID-19 VIRUS INFECTION: ICD-10-CM

## 2021-01-26 PROCEDURE — G0399 HOME SLEEP TEST/TYPE 3 PORTA: HCPCS

## 2021-01-26 PROCEDURE — 93226 XTRNL ECG REC<48 HR SCAN A/R: CPT

## 2021-01-26 PROCEDURE — 93225 XTRNL ECG REC<48 HRS REC: CPT

## 2021-01-30 NOTE — PROGRESS NOTES
[address] [address]                               SLEEP STUDY REPORT    PATIENT NAME: Anita Montes                     :        1960  MED REC NO:   [id number]                            ROOM:  ACCOUNT NO:   [de-identified]                           ADMIT DATE: 2021  PROVIDER:     Gracia Pandya MD    DATE OF STUDY:  2021    REFERRING PROVIDER:  Dr. Lacy Casper. STUDY PERFORMED:  Sleep study. INDICATION FOR SLEEP STUDY:  Wakes gasping, snoring, trouble with  memory/concentration, and witnessed apnea. CURRENT MEDICATIONS:  Protonix and Symbicort. INTERPRETATION:  This sleep study was performed as a home sleep apnea  test.  An ApneaLink air monitoring device was utilized for the study. Total recording time was 6 hours and 24 minutes. Flow evaluation time  was 5 hours and 12 minutes and oxygen saturation evaluation time was 6  hours and 14 minutes. RESPIRATION SUMMARY:  APNEA:  There were 71 apneic events including 22 obstructive, 24  central, and 25 mixed. HYPOPNEA:  There were 154 hypopneic events. RESPIRATORY EVENT INDEX:  The respiratory event index is 43. OXYGEN SATURATION:  Baseline oxygen saturation is 96%. Lowest  saturation was 80%. The patient spent 6% of the time with saturation  less than 90%. HEART RATE SUMMARY:  Average heart rate was 60 beats per minute. Maximum heart rate was 86 beats per minute and minimum heart rate was 50  beats per minute. FLOW LIMITATION:  Flow limitation breaths without snore occurred 1217  times. Flow limitation breaths with snore occurred 30 times. There  were 177 snore events. Snoring was moderate. MISCELLANEOUS:  [place] Sleepiness Scale score is 9/24. BMI is 25  kg/m2. Neck circumference is 16.5 inches. IMPRESSION:  1. Severe obstructive sleep apnea. 2.  Moderate snoring. 3.  Good oxygen saturation.   4.  Significant central component to the sleep apnea is noted. DISCUSSION:  This patient obviously should be treated. However, as  there is a significant central component to this patient's sleep apnea,  it would be best for the patient to return to [address] [place] [address]  for a full night of titration rather than use auto-CPAP. SUGGESTIONS:  1.  Dr. Syeda Moyer to discuss the results of study with the patient. 2.  The patient should return to [address] [place] [address] for positive  airway pressure titration.         Mary Jo Treadwell MD  Diplomat of Sleep Medicine    D: 01/29/2021 17:56:34       T: 01/29/2021 18:05:54     [email or web address]  Job#: [id number]     Doc#: [id number]    CC:

## 2021-02-03 ENCOUNTER — OFFICE VISIT (OUTPATIENT)
Dept: PRIMARY CARE CLINIC | Age: 61
End: 2021-02-03
Payer: COMMERCIAL

## 2021-02-03 VITALS
DIASTOLIC BLOOD PRESSURE: 70 MMHG | SYSTOLIC BLOOD PRESSURE: 104 MMHG | TEMPERATURE: 97.7 F | HEIGHT: 74 IN | BODY MASS INDEX: 27.08 KG/M2 | OXYGEN SATURATION: 97 % | WEIGHT: 211 LBS | HEART RATE: 68 BPM | RESPIRATION RATE: 20 BRPM

## 2021-02-03 DIAGNOSIS — U07.1 COVID-19 VIRUS INFECTION: ICD-10-CM

## 2021-02-03 DIAGNOSIS — R53.83 FATIGUE, UNSPECIFIED TYPE: ICD-10-CM

## 2021-02-03 DIAGNOSIS — G47.31 CENTRAL SLEEP APNEA: ICD-10-CM

## 2021-02-03 DIAGNOSIS — G47.33 OBSTRUCTIVE SLEEP APNEA OF ADULT: ICD-10-CM

## 2021-02-03 DIAGNOSIS — R42 DIZZINESS: Primary | ICD-10-CM

## 2021-02-03 DIAGNOSIS — R06.02 SHORTNESS OF BREATH: ICD-10-CM

## 2021-02-03 LAB — SARS-COV-2 ANTIBODY, TOTAL: REACTIVE

## 2021-02-03 PROCEDURE — 99214 OFFICE O/P EST MOD 30 MIN: CPT | Performed by: FAMILY MEDICINE

## 2021-02-03 ASSESSMENT — ENCOUNTER SYMPTOMS
SORE THROAT: 0
VOMITING: 0
CONSTIPATION: 0
RHINORRHEA: 0
DIARRHEA: 0
ABDOMINAL PAIN: 0
NAUSEA: 0
WHEEZING: 0
COUGH: 1
SHORTNESS OF BREATH: 1

## 2021-02-03 NOTE — PROGRESS NOTES
2/3/2021     Chief Complaint   Patient presents with    Results       HPI  Deyanira Briceño (:  1960) is a 56 y.o. male, here for evaluation of the following medical concerns:    Patient has ongoing issues with the sequela of COVID-19. Patient reports ongoing issues with fatigue and at time dizziness. Reports sleeping upwards of 12-16 hours a day. Patient is still working. Feels like when he puts his head down to relax he can fall asleep.      At his last appointment he was still having significant dyspnea which has improved slightly. No sig. Cough. Cold air seems to be hard on him from a breathing standpoint. Patient is using his rescue inhaler only a few times a week along with Symbicort and Singulair.      Patient has had symptoms for >1 month. Labs have been normal. XR of chest has shown improvement. Patient had a normal EKG at his last apt.      Saw neurology for headache and dizziness issues. Was given a new muscle relaxer. Made him very tired. Can not recall the exact dx he was given for his headaches and dizziness. Patient was essentially advise to work on posture. No labs or testing was ordered    Recent labs: CRP 0.3, TSH 1.5, free T4 1.26. Holter monitor essentially normal.    Sleep study shows severe obstructive sleep apnea, moderate snoring, good oxygen saturations, central component to sleep apnea. Review of Systems   Constitutional: Positive for fatigue. Negative for chills and fever. HENT: Negative for congestion, rhinorrhea and sore throat. Respiratory: Positive for cough and shortness of breath. Negative for wheezing. Cardiovascular: Negative for chest pain and leg swelling. Gastrointestinal: Negative for abdominal pain, constipation, diarrhea, nausea and vomiting. Skin: Negative for rash. Neurological: Positive for dizziness. Negative for light-headedness and headaches.        Past Medical History:   Diagnosis Date    Asthma  GERD (gastroesophageal reflux disease)     Headache        Prior to Visit Medications    Medication Sig Taking? Authorizing Provider   triamcinolone (KENALOG) 0.1 % cream Apply topically 2 times daily for up to 2 weeks at a time. Yes Salbador Brown MD   budesonide-formoterol (SYMBICORT) 160-4.5 MCG/ACT AERO Inhale 2 puffs into the lungs 2 times daily Yes Salbador Brown MD   albuterol (PROVENTIL) (2.5 MG/3ML) 0.083% nebulizer solution Take 3 mLs by nebulization every 6 hours as needed for Wheezing Yes Salbador Brown MD   montelukast (SINGULAIR) 10 MG tablet Take 1 tablet by mouth nightly Yes Salbador Brown MD   albuterol sulfate HFA (VENTOLIN HFA) 108 (90 Base) MCG/ACT inhaler Inhale 2 puffs into the lungs every 6 hours as needed for Wheezing or Shortness of Breath Yes Salbador Brown MD   pantoprazole (PROTONIX) 40 MG tablet Take 40 mg by mouth daily Yes Historical MD Nivia   Cholecalciferol 100 MCG (4000 UT) CAPS Take 4,000 Units by mouth daily Yes Salbador Brown MD   Magnesium 400 MG CAPS Take 400 mg by mouth daily Yes Salbador Brown MD        Allergies   Allergen Reactions    Aspirin Shortness Of Breath    Nsaids     Shellfish-Derived Products        Social History     Tobacco Use    Smoking status: Never Smoker    Smokeless tobacco: Never Used   Substance Use Topics    Alcohol use: Yes     Frequency: Never     Drinks per session: 1 or 2     Binge frequency: Never           Vitals:    02/03/21 1015   BP: 104/70   Pulse: 68   Resp: 20   Temp: 97.7 °F (36.5 °C)   TempSrc: Temporal   SpO2: 97%   Weight: 211 lb (95.7 kg)   Height: 6' 1.5\" (1.867 m)     Estimated body mass index is 27.46 kg/m² as calculated from the following:    Height as of this encounter: 6' 1.5\" (1.867 m). Weight as of this encounter: 211 lb (95.7 kg). Physical Exam  Constitutional:       Appearance: He is well-developed. HENT:      Head: Normocephalic.    Eyes: Conjunctiva/sclera: Conjunctivae normal.      Pupils: Pupils are equal, round, and reactive to light. Cardiovascular:      Rate and Rhythm: Normal rate and regular rhythm. Heart sounds: Normal heart sounds. No murmur. Pulmonary:      Effort: Pulmonary effort is normal.      Breath sounds: Normal breath sounds. No wheezing or rales. Abdominal:      General: Bowel sounds are normal.      Palpations: Abdomen is soft. Tenderness: There is no abdominal tenderness. Neurological:      Mental Status: He is alert. Comments: Cranial nerves grossly intact         ASSESSMENT/PLAN:  Aga Pham was seen today for results. Diagnoses and all orders for this visit:    Dizziness  Improving slowly. May be secondary from the patient's history of COVID-19 infection versus his obstructive sleep apnea issues. Fatigue, unspecified type  Improving slowly. May be secondary from the patient's history of COVID-19 infection versus his obstructive sleep apnea issues. Obstructive sleep apnea of adult  -     Sleep Study with PAP Titration; Future  -     [facility] ([facility]) - Ting Dotson DO, Pulmonary, [place]  Severe obstructive sleep apnea with a central component. Patient will need a full sleep study with titration. Patient will need to follow with pulmonology for this issue. He may also review his issues with shortness of breath with pulmonology in the future also. Shortness of breath  -     Cancel: XR CHEST (2 VW); Future  Patient to follow-up with pulmonology. Improving. Goal is to get the patient off of his rescue inhaler usage and no more use and twice a month. Central sleep apnea  -     Sleep Study with PAP Titration;  Future  -     [facility]) - Ting Dotson DO, Pulmonary, [facility] [place] Severe obstructive sleep apnea with a central component. Patient will need a full sleep study with titration. Patient will need to follow with pulmonology for this issue. He may also review his issues with shortness of breath with pulmonology in the future also. COVID-19 virus infection  -     Covid-19, Antibody; Future  Patient was initially diagnosed in November. Patient has dealt with sequela since this time. Slowly improving. Return in about 4 weeks (around 3/3/2021) for Routine Follow-up of Chronic Conditions. An [employer]ature was used to authenticate this note.     --Flaco Dick MD on 2/3/21 at 10:12 AM EST

## 2021-02-15 DIAGNOSIS — G47.33 OBSTRUCTIVE SLEEP APNEA (ADULT) (PEDIATRIC): Primary | ICD-10-CM

## 2021-02-24 ENCOUNTER — HOSPITAL ENCOUNTER (OUTPATIENT)
Age: 61
Discharge: HOME | End: 2021-02-26
Payer: COMMERCIAL

## 2021-02-24 DIAGNOSIS — G47.33 OBSTRUCTIVE SLEEP APNEA (ADULT) (PEDIATRIC): ICD-10-CM

## 2021-02-24 PROCEDURE — U0003 INFECTIOUS AGENT DETECTION BY NUCLEIC ACID (DNA OR RNA); SEVERE ACUTE RESPIRATORY SYNDROME CORONAVIRUS 2 (SARS-COV-2) (CORONAVIRUS DISEASE [COVID-19]), AMPLIFIED PROBE TECHNIQUE, MAKING USE OF HIGH THROUGHPUT TECHNOLOGIES AS DESCRIBED BY CMS-2020-01-R: HCPCS

## 2021-02-25 LAB
SARS-COV-2: NOT DETECTED
SOURCE: NORMAL

## 2021-03-03 ENCOUNTER — HOSPITAL ENCOUNTER (OUTPATIENT)
Dept: SLEEP CENTER | Age: 61
Discharge: HOME | End: 2021-03-03
Payer: COMMERCIAL

## 2021-03-03 ENCOUNTER — OFFICE VISIT (OUTPATIENT)
Dept: PRIMARY CARE CLINIC | Age: 61
End: 2021-03-03
Payer: COMMERCIAL

## 2021-03-03 VITALS
HEIGHT: 74 IN | HEART RATE: 65 BPM | DIASTOLIC BLOOD PRESSURE: 74 MMHG | BODY MASS INDEX: 27.59 KG/M2 | SYSTOLIC BLOOD PRESSURE: 114 MMHG | TEMPERATURE: 97.7 F | RESPIRATION RATE: 24 BRPM | WEIGHT: 215 LBS | OXYGEN SATURATION: 98 %

## 2021-03-03 DIAGNOSIS — R53.83 FATIGUE, UNSPECIFIED TYPE: Primary | ICD-10-CM

## 2021-03-03 DIAGNOSIS — M77.01 MEDIAL EPICONDYLITIS OF RIGHT ELBOW: ICD-10-CM

## 2021-03-03 DIAGNOSIS — G47.33 OBSTRUCTIVE SLEEP APNEA OF ADULT: ICD-10-CM

## 2021-03-03 DIAGNOSIS — R06.02 SHORTNESS OF BREATH: ICD-10-CM

## 2021-03-03 DIAGNOSIS — J45.40 MODERATE PERSISTENT ASTHMA WITHOUT COMPLICATION: ICD-10-CM

## 2021-03-03 DIAGNOSIS — G47.31 CENTRAL SLEEP APNEA: ICD-10-CM

## 2021-03-03 PROCEDURE — 95811 POLYSOM 6/>YRS CPAP 4/> PARM: CPT

## 2021-03-03 PROCEDURE — 99213 OFFICE O/P EST LOW 20 MIN: CPT | Performed by: FAMILY MEDICINE

## 2021-03-03 ASSESSMENT — ENCOUNTER SYMPTOMS
WHEEZING: 0
CONSTIPATION: 0
COUGH: 1
DIARRHEA: 0
VOMITING: 0
NAUSEA: 0
ABDOMINAL PAIN: 0
SORE THROAT: 0
RHINORRHEA: 0
SHORTNESS OF BREATH: 1

## 2021-03-03 NOTE — PROGRESS NOTES
Neurological: Positive for dizziness. Negative for light-headedness and headaches. Past Medical History:   Diagnosis Date    Asthma     GERD (gastroesophageal reflux disease)     Headache        Prior to Visit Medications    Medication Sig Taking? Authorizing Provider   triamcinolone (KENALOG) 0.1 % cream Apply topically 2 times daily for up to 2 weeks at a time. Yes Emma Ortez MD   budesonide-formoterol (SYMBICORT) 160-4.5 MCG/ACT AERO Inhale 2 puffs into the lungs 2 times daily Yes Emma Ortez MD   albuterol (PROVENTIL) (2.5 MG/3ML) 0.083% nebulizer solution Take 3 mLs by nebulization every 6 hours as needed for Wheezing Yes Emma Ortez MD   montelukast (SINGULAIR) 10 MG tablet Take 1 tablet by mouth nightly Yes Emma Ortez MD   albuterol sulfate HFA (VENTOLIN HFA) 108 (90 Base) MCG/ACT inhaler Inhale 2 puffs into the lungs every 6 hours as needed for Wheezing or Shortness of Breath Yes Emma Ortez MD   pantoprazole (PROTONIX) 40 MG tablet Take 40 mg by mouth daily Yes Historical Provider, MD   Cholecalciferol 100 MCG (4000 UT) CAPS Take 4,000 Units by mouth daily Yes Emma Ortez MD   Magnesium 400 MG CAPS Take 400 mg by mouth daily Yes Emma Ortez MD        Allergies   Allergen Reactions    Aspirin Shortness Of Breath    Nsaids     Shellfish-Derived Products        Social History     Tobacco Use    Smoking status: Never Smoker    Smokeless tobacco: Never Used   Substance Use Topics    Alcohol use: Yes     Frequency: Never     Drinks per session: 1 or 2     Binge frequency: Never           Vitals:    03/03/21 0828   BP: 114/74   Pulse: 65   Resp: 24   Temp: 97.7 °F (36.5 °C)   TempSrc: Temporal   SpO2: 98%   Weight: 215 lb (97.5 kg)   Height: 6' 1.5\" (1.867 m)     Estimated body mass index is 27.98 kg/m² as calculated from the following:    Height as of this encounter: 6' 1.5\" (1.867 m). Weight as of this encounter: 215 lb (97.5 kg).     Physical Exam Constitutional:       Appearance: He is well-developed. HENT:      Head: Normocephalic. Eyes:      Conjunctiva/sclera: Conjunctivae normal.      Pupils: Pupils are equal, round, and reactive to light. Cardiovascular:      Rate and Rhythm: Normal rate and regular rhythm. Heart sounds: Normal heart sounds. No murmur. Pulmonary:      Effort: Pulmonary effort is normal.      Breath sounds: Normal breath sounds. No wheezing or rales. Abdominal:      General: Bowel sounds are normal.      Palpations: Abdomen is soft. Tenderness: There is no abdominal tenderness. Musculoskeletal:      Right elbow: Tenderness found. Medial epicondyle tenderness noted. Arms:    Neurological:      Mental Status: He is alert. Comments: Cranial nerves grossly intact         ASSESSMENT/PLAN:  Vaishali Eugene was seen today for arm pain, fatigue and dizziness. Diagnoses and all orders for this visit:    Fatigue, unspecified type  -     Testosterone, free, total; Future    Obstructive sleep apnea of adult  -     Testosterone, free, total; Future  Severe. Check testosterone level as this may also be contributing to his tiredness and fatigue and worsening SHAMEKA. Shortness of breath  Still an ongoing issue. Most likely secondary to the patient's COVID-19 infection. Patient will need to be followed up for this issue by pulmonology when she is able to establish. Moderate persistent asthma without complication  Patient to follow-up with pulmonology after he has completed his sleep study tonight. Patient will be placed on PAP titration tonight. Medial epicondylitis of right elbow  Patient advised that at home conservative management with physical therapy exercises and stretches. Consider referral to outpatient physical therapy. Consider referral to orthopedics. Trigger finger issues: Offered physical therapy versus orthopedic referral which she declined at this time. Return in about 2 months (around 5/3/2021) for Routine Follow-up of Chronic Conditions. An electronicsignature was used to authenticate this note.     --Cristina Smith MD on 3/3/21 at 7:59 AM EST

## 2021-03-04 VITALS
OXYGEN SATURATION: 98 % | BODY MASS INDEX: 24.91 KG/M2 | DIASTOLIC BLOOD PRESSURE: 70 MMHG | SYSTOLIC BLOOD PRESSURE: 139 MMHG | HEART RATE: 64 BPM | WEIGHT: 211 LBS | HEIGHT: 77 IN | TEMPERATURE: 97.9 F

## 2021-03-07 NOTE — PROGRESS NOTES
[id number] [address]                               SLEEP STUDY REPORT    PATIENT NAME: Kristina Aranda                     :        1960  MED REC NO:   [id number]                            ROOM:  ACCOUNT NO:   [de-identified]                           ADMIT DATE: 2021  PROVIDER:     Jaskaran Covarrubias MD    DATE OF STUDY:  2021    REFERRING PROVIDER:  Dr. Emma Ceja. STUDY PERFORMED:  Polysomnography. INDICATION FOR POLYSOMNOGRAPHY:  Known sleep apnea - for positive airway  pressure titration. CURRENT MEDICATIONS:  Protonix and Symbicort. INTERPRETATION:  SLEEP ARCHITECTURE:  This patient had a total time in bed of 451  minutes. Total sleep time was 376 minutes. Sleep efficiency was 83%. Sleep latency was five minutes and REM latency was 73 minutes. SLEEP STAGING:  The patient was awake 17% of the time in bed. Stage N1  was 7% and N2 was 67% of the total sleep time. Slow wave sleep was 2%  of the sleep time and stage REM sleep was 24% of the sleep time. RESPIRATION SUMMARY:  APNEA:  There were 30 apneic events including 20 central, five  obstructive, and five mixed. Maximum duration of an apneic event was 20  seconds and all events occurred during non-REM stage sleep. HYPOPNEA:  There were seven hypopneic events, two occurred during REM  stage sleep and maximum duration was 32 seconds. APNEA/HYPOPNEA INDEX:  The apnea/hypopnea index is six. Of the 37  respiratory events, 22 occurred in the supine position. TITRATION OF CPAP: This patient was started at a CPAP of   four, which was gradually increased to a CPAP of seven. On a  CPAP of seven, the patient slept for 61 minutes in REM stage sleep and  159 minutes in non-REM stage sleep. The apnea/hypopnea index was less  than five.   The index was better on CPAP of six, although the patient  did not have any REM stage sleep in the supine position at CPAP of six. AROUSAL ANALYSIS:  There were 55 arousals/awakenings, the sleep  disruption index was normal.    LIMB MOVEMENT SUMMARY:  There were 153 limb movements, the limb movement  index is 24 (normal less than 15). OXYGEN SATURATION:  Average oxygen saturation while awake was 96%. Lowest saturation was 92%. HEART RATE SUMMARY:  Average heart rate while awake was 64 beats per  minute. Maximum heart rate during sleep was 78 beats per minute and  minimum heart rate during sleep was 52 beats per minute. There were  rare PACs, which were not associated with respiratory events. MISCELLANEOUS:  [place] Sleepiness Scale score is 9/24. Snoring was  mild and eventually eliminated. There was bruxism. IMPRESSION:  1. Known sleep apnea. 2.  Optimal titration with CPAP. 3.  Snoring eliminated. 4.  Excellent oxygen saturation. 5.  No significant cardiac dysrhythmia. DISCUSSION:  On a previous study, which was done at home, this patient   was found to have sleep apnea syndrome with a   significant central component. With titration of CPAP, which the  patient tolerated well, optimal results were achieved with elimination  of snoring, normalization of oxygen saturation, and normalization of the  apnea/hypopnea index. SUGGESTIONS:  1.  Dr. Emre Scott to discuss the results of study with the patient. 2.  The patient should have CPAP at 7 cm of water pressure. 3.  The patient should use Respironics medium DreamWear gel pillows with  medium headgear, heated humidification and EPR of three (the patient  would prefer getting equipment from [facility] if they are  in his insurance network).         Nitesh Richard MD  Diplomat of Sleep Medicine    D: 03/05/2021 12:33:47       T: 03/05/2021 12:44:51     [email or web address]  Job#: [id number]     Doc#: [id number]    CC:

## 2021-04-05 DIAGNOSIS — R53.83 FATIGUE, UNSPECIFIED TYPE: ICD-10-CM

## 2021-04-05 DIAGNOSIS — G47.33 OBSTRUCTIVE SLEEP APNEA OF ADULT: ICD-10-CM

## 2021-04-08 LAB
SEX HORMONE BINDING GLOBULIN: 28 NMOL/L (ref 11–80)
TESTOSTERONE FREE-NONMALE: 75.4 PG/ML (ref 47–244)
TESTOSTERONE TOTAL: 344 NG/DL (ref 220–1000)

## 2021-04-14 ENCOUNTER — OFFICE VISIT (OUTPATIENT)
Dept: PRIMARY CARE CLINIC | Age: 61
End: 2021-04-14
Payer: COMMERCIAL

## 2021-04-14 VITALS
OXYGEN SATURATION: 98 % | BODY MASS INDEX: 25.98 KG/M2 | HEART RATE: 64 BPM | RESPIRATION RATE: 20 BRPM | DIASTOLIC BLOOD PRESSURE: 84 MMHG | SYSTOLIC BLOOD PRESSURE: 120 MMHG | HEIGHT: 77 IN | WEIGHT: 220 LBS | TEMPERATURE: 97.4 F

## 2021-04-14 DIAGNOSIS — E66.3 OVERWEIGHT (BMI 25.0-29.9): ICD-10-CM

## 2021-04-14 DIAGNOSIS — Z86.16 HISTORY OF 2019 NOVEL CORONAVIRUS DISEASE (COVID-19): Primary | ICD-10-CM

## 2021-04-14 DIAGNOSIS — R53.83 FATIGUE, UNSPECIFIED TYPE: ICD-10-CM

## 2021-04-14 DIAGNOSIS — E55.9 VITAMIN D DEFICIENCY: ICD-10-CM

## 2021-04-14 DIAGNOSIS — G47.33 OBSTRUCTIVE SLEEP APNEA OF ADULT: ICD-10-CM

## 2021-04-14 DIAGNOSIS — Z13.220 LIPID SCREENING: ICD-10-CM

## 2021-04-14 PROCEDURE — 99214 OFFICE O/P EST MOD 30 MIN: CPT | Performed by: FAMILY MEDICINE

## 2021-04-14 RX ORDER — AZELASTINE 1 MG/ML
2 SPRAY, METERED NASAL 2 TIMES DAILY
Qty: 2 BOTTLE | Refills: 3 | Status: SHIPPED
Start: 2021-04-14 | End: 2022-10-20

## 2021-04-14 ASSESSMENT — ENCOUNTER SYMPTOMS
SHORTNESS OF BREATH: 0
VOMITING: 0
WHEEZING: 0
NAUSEA: 0
RHINORRHEA: 0
ABDOMINAL PAIN: 0
CONSTIPATION: 0
DIARRHEA: 0
SORE THROAT: 0

## 2021-04-14 NOTE — PROGRESS NOTES
Date    Asthma     GERD (gastroesophageal reflux disease)     Headache        Prior to Visit Medications    Medication Sig Taking? Authorizing Provider   azelastine (ASTELIN) 0.1 % nasal spray 2 sprays by Nasal route 2 times daily Use in each nostril as directed Yes Ирина Armendariz MD   triamcinolone (KENALOG) 0.1 % cream Apply topically 2 times daily for up to 2 weeks at a time. Yes Ирина Armendariz MD   budesonide-formoterol (SYMBICORT) 160-4.5 MCG/ACT AERO Inhale 2 puffs into the lungs 2 times daily Yes Ирина Armendariz MD   albuterol (PROVENTIL) (2.5 MG/3ML) 0.083% nebulizer solution Take 3 mLs by nebulization every 6 hours as needed for Wheezing Yes Ирина Armendariz MD   montelukast (SINGULAIR) 10 MG tablet Take 1 tablet by mouth nightly Yes Ирина Armendariz MD   albuterol sulfate HFA (VENTOLIN HFA) 108 (90 Base) MCG/ACT inhaler Inhale 2 puffs into the lungs every 6 hours as needed for Wheezing or Shortness of Breath Yes Ирина Armendariz MD   pantoprazole (PROTONIX) 40 MG tablet Take 40 mg by mouth daily Yes Historical Provider, MD   Cholecalciferol 100 MCG (4000 UT) CAPS Take 4,000 Units by mouth daily Yes Ирина Armendariz MD   Magnesium 400 MG CAPS Take 400 mg by mouth daily Yes Ирина Amrendariz MD        Allergies   Allergen Reactions    Aspirin Shortness Of Breath    Nsaids     Shellfish-Derived Products        Social History     Tobacco Use    Smoking status: Never Smoker    Smokeless tobacco: Never Used   Substance Use Topics    Alcohol use: Yes     Frequency: Never     Drinks per session: 1 or 2     Binge frequency: Never           Vitals:    04/14/21 0914   BP: 120/84   Pulse: 64   Resp: 20   Temp: 97.4 °F (36.3 °C)   TempSrc: Temporal   SpO2: 98%   Weight: 220 lb (99.8 kg)   Height: 6' 5\" (1.956 m)     Estimated body mass index is 26.09 kg/m² as calculated from the following:    Height as of this encounter: 6' 5\" (1.956 m). Weight as of this encounter: 220 lb (99.8 kg).     Physical Exam  Constitutional:       Appearance: He is well-developed. HENT:      Head: Normocephalic. Eyes:      Extraocular Movements: Extraocular movements intact. Conjunctiva/sclera: Conjunctivae normal.   Cardiovascular:      Rate and Rhythm: Normal rate and regular rhythm. Heart sounds: Normal heart sounds. No murmur. Pulmonary:      Effort: Pulmonary effort is normal.      Breath sounds: Normal breath sounds. No wheezing or rales. Abdominal:      General: Bowel sounds are normal.      Palpations: Abdomen is soft. Tenderness: There is no abdominal tenderness. Musculoskeletal:      Right lower leg: No edema. Left lower leg: No edema. Skin:     Findings: No rash. Neurological:      General: No focal deficit present. Mental Status: He is alert. Comments: Cranial nerves grossly intact   Psychiatric:         Mood and Affect: Mood normal.         Judgment: Judgment normal.         ASSESSMENT/PLAN:  Arcenio Salas was seen today for sleep apnea and discuss medications. Diagnoses and all orders for this visit:    History of 2019 novel coronavirus disease (COVID-19)  -     Covid-19, Antibody; Future  Patient symptoms are slowly resolving over time. Patient's current symptoms include mild cough at times, dyspnea on exertion, fatigue, excess sleepiness. Patient was recently diagnosed with significant obstructive sleep apnea. Treatment as noted below. Consider fluvoxamine for other symptoms including mental fogginess and fatigue. Fatigue, unspecified type  -     CBC WITH AUTO DIFFERENTIAL; Future  -     COMPREHENSIVE METABOLIC PANEL; Future  -     TSH; Future  Secondary to obstructive sleep apnea versus acute long-haul Covid. Consider fluvoxamine in the near future. Obstructive sleep apnea of adult  Patient is to start using a sleep apnea machine tonight, CPAP with a setting of 7.   To open the patient's airway and nasal passages he will start a Zyrtec, Claritin, or Xyzal or Allegra. Also start a Flonase or Nasacort. Patient will also start azelastine nasal spray. Overweight (BMI 25.0-29. 9)  Slight improvement in weight over the last several office appointments. Congratulated patient on efforts. Continue improving lifestyle modifications. Vitamin D deficiency  -     Vitamin D 25 Hydroxy; Future    Lipid screening  -     Lipid Panel; Future    Labs and f/u in 6 weeks. Return in about 6 weeks (around 5/26/2021). An Instant Opinionignature was used to authenticate this note.     --Carina Elizondo MD on 4/14/21 at 9:27 AM EDT

## 2021-05-24 DIAGNOSIS — Z86.16 HISTORY OF 2019 NOVEL CORONAVIRUS DISEASE (COVID-19): ICD-10-CM

## 2021-05-24 DIAGNOSIS — E55.9 VITAMIN D DEFICIENCY: ICD-10-CM

## 2021-05-24 DIAGNOSIS — Z13.220 LIPID SCREENING: ICD-10-CM

## 2021-05-24 DIAGNOSIS — R53.83 FATIGUE, UNSPECIFIED TYPE: ICD-10-CM

## 2021-05-24 LAB
ALBUMIN SERPL-MCNC: 4.4 G/DL (ref 3.5–5.2)
ALP BLD-CCNC: 82 U/L (ref 40–129)
ALT SERPL-CCNC: 29 U/L (ref 0–40)
ANION GAP SERPL CALCULATED.3IONS-SCNC: 15 MMOL/L (ref 7–16)
AST SERPL-CCNC: 23 U/L (ref 0–39)
BASOPHILS ABSOLUTE: 0.04 E9/L (ref 0–0.2)
BASOPHILS RELATIVE PERCENT: 0.5 % (ref 0–2)
BILIRUB SERPL-MCNC: 0.4 MG/DL (ref 0–1.2)
BUN BLDV-MCNC: 12 MG/DL (ref 6–23)
CALCIUM SERPL-MCNC: 10.2 MG/DL (ref 8.6–10.2)
CHLORIDE BLD-SCNC: 103 MMOL/L (ref 98–107)
CHOLESTEROL, TOTAL: 191 MG/DL (ref 0–199)
CO2: 25 MMOL/L (ref 22–29)
CREAT SERPL-MCNC: 1 MG/DL (ref 0.7–1.2)
EOSINOPHILS ABSOLUTE: 0.19 E9/L (ref 0.05–0.5)
EOSINOPHILS RELATIVE PERCENT: 2.2 % (ref 0–6)
GFR AFRICAN AMERICAN: >60
GFR NON-AFRICAN AMERICAN: >60 ML/MIN/1.73
GLUCOSE BLD-MCNC: 105 MG/DL (ref 74–99)
HCT VFR BLD CALC: 46.8 % (ref 37–54)
HDLC SERPL-MCNC: 34 MG/DL
HEMOGLOBIN: 15.4 G/DL (ref 12.5–16.5)
IMMATURE GRANULOCYTES #: 0.02 E9/L
IMMATURE GRANULOCYTES %: 0.2 % (ref 0–5)
LDL CHOLESTEROL CALCULATED: 134 MG/DL (ref 0–99)
LYMPHOCYTES ABSOLUTE: 2.97 E9/L (ref 1.5–4)
LYMPHOCYTES RELATIVE PERCENT: 34.1 % (ref 20–42)
MCH RBC QN AUTO: 30.9 PG (ref 26–35)
MCHC RBC AUTO-ENTMCNC: 32.9 % (ref 32–34.5)
MCV RBC AUTO: 94 FL (ref 80–99.9)
MONOCYTES ABSOLUTE: 0.66 E9/L (ref 0.1–0.95)
MONOCYTES RELATIVE PERCENT: 7.6 % (ref 2–12)
NEUTROPHILS ABSOLUTE: 4.83 E9/L (ref 1.8–7.3)
NEUTROPHILS RELATIVE PERCENT: 55.4 % (ref 43–80)
PDW BLD-RTO: 11.8 FL (ref 11.5–15)
PLATELET # BLD: 213 E9/L (ref 130–450)
PMV BLD AUTO: 10.4 FL (ref 7–12)
POTASSIUM SERPL-SCNC: 4.7 MMOL/L (ref 3.5–5)
RBC # BLD: 4.98 E12/L (ref 3.8–5.8)
SARS-COV-2 ANTIBODY, TOTAL: REACTIVE
SODIUM BLD-SCNC: 143 MMOL/L (ref 132–146)
TOTAL PROTEIN: 7.5 G/DL (ref 6.4–8.3)
TRIGL SERPL-MCNC: 115 MG/DL (ref 0–149)
TSH SERPL DL<=0.05 MIU/L-ACNC: 1.57 UIU/ML (ref 0.27–4.2)
VITAMIN D 25-HYDROXY: 32 NG/ML (ref 30–100)
VLDLC SERPL CALC-MCNC: 23 MG/DL
WBC # BLD: 8.7 E9/L (ref 4.5–11.5)

## 2021-05-26 ENCOUNTER — OFFICE VISIT (OUTPATIENT)
Dept: PRIMARY CARE CLINIC | Age: 61
End: 2021-05-26
Payer: COMMERCIAL

## 2021-05-26 VITALS
BODY MASS INDEX: 26.09 KG/M2 | HEIGHT: 77 IN | SYSTOLIC BLOOD PRESSURE: 116 MMHG | RESPIRATION RATE: 20 BRPM | DIASTOLIC BLOOD PRESSURE: 74 MMHG | OXYGEN SATURATION: 98 % | WEIGHT: 221 LBS | HEART RATE: 64 BPM | TEMPERATURE: 97.4 F

## 2021-05-26 DIAGNOSIS — Z86.16 HISTORY OF 2019 NOVEL CORONAVIRUS DISEASE (COVID-19): Primary | ICD-10-CM

## 2021-05-26 DIAGNOSIS — E78.2 MIXED HYPERLIPIDEMIA: ICD-10-CM

## 2021-05-26 DIAGNOSIS — R53.83 FATIGUE, UNSPECIFIED TYPE: ICD-10-CM

## 2021-05-26 DIAGNOSIS — G89.29 CHRONIC BILATERAL LOW BACK PAIN, UNSPECIFIED WHETHER SCIATICA PRESENT: ICD-10-CM

## 2021-05-26 DIAGNOSIS — T23.239A SECOND DEGREE BURN OF FINGERS: ICD-10-CM

## 2021-05-26 DIAGNOSIS — E55.9 VITAMIN D DEFICIENCY: ICD-10-CM

## 2021-05-26 DIAGNOSIS — M54.50 CHRONIC BILATERAL LOW BACK PAIN, UNSPECIFIED WHETHER SCIATICA PRESENT: ICD-10-CM

## 2021-05-26 DIAGNOSIS — M54.2 CERVICALGIA: ICD-10-CM

## 2021-05-26 DIAGNOSIS — E66.3 OVERWEIGHT (BMI 25.0-29.9): ICD-10-CM

## 2021-05-26 DIAGNOSIS — G47.33 OBSTRUCTIVE SLEEP APNEA OF ADULT: ICD-10-CM

## 2021-05-26 DIAGNOSIS — J45.40 MODERATE PERSISTENT ASTHMA WITHOUT COMPLICATION: ICD-10-CM

## 2021-05-26 PROCEDURE — 99214 OFFICE O/P EST MOD 30 MIN: CPT | Performed by: FAMILY MEDICINE

## 2021-05-26 ASSESSMENT — ENCOUNTER SYMPTOMS
DIARRHEA: 0
RHINORRHEA: 0
NAUSEA: 0
CONSTIPATION: 0
ABDOMINAL PAIN: 0
COUGH: 1
SORE THROAT: 0
VOMITING: 0
WHEEZING: 0
SHORTNESS OF BREATH: 1

## 2021-05-26 NOTE — PROGRESS NOTES
2021     Chief Complaint   Patient presents with    Fatigue     still no cpap-awaiting on supply company    Medication Refill     linzess-previously filled by stomach doctor-not sure of dose     HPI  Patricia Blackwell (:  1960) is a 56 y.o. male, here for evaluation of the following medical concerns:    Patient has ongoing issues with the sequela of COVID-19. Patient reports ongoing issues with fatigue and at time dizziness, mild-mod dyspena on exertion. Reports sleeping upwards of 12-16 hours a day. Patient is still working. Feels like when he puts his head down to relax he can fall asleep. + SHAMEKA. Has CPAP at home. Patient does have nasacort. Reports that these above issues have improved since his last apt. Improved modestly with CPAP usage. However, he had to switch suppliers due to issues and is not currently using a CPAP.      Patient is using his rescue inhaler rarely along with Symbicort and Singulair.      Patient has had symptoms for >1 month. Labs have been normal. XR of chest has shown improvement. Patient had a normal EKG.    Saw neurology for headache and dizziness issues. Was given a new muscle relaxer. Made him very tired. Can not recall the exact dx he was given for his headaches and dizziness. Patient was essentially advise to work on posture. No labs or testing was ordered.     Holter monitor essentially normal.  -----  GERD: Follows with GI for this issues. Protonix 40mg daily. Has not had an upper GI, but is due for a colonoscopy. Previously no issues. Interested in restarting [address]. Needs to see GI for this issue.      Labs: TSH 1.5, CMP essentially within normal limits besides mild elevated glucose 105, CBC within normal limits, vitamin D 32, total cholesterol 191, triglycerides 115, HDL 34, , antibodies reactive. HM: COVID vaccine. Review of Systems   Constitutional: Positive for fatigue. Negative for chills and fever.    HENT: Negative for congestion, rhinorrhea and sore throat. Respiratory: Positive for cough and shortness of breath. Negative for wheezing. Cardiovascular: Negative for chest pain and leg swelling. Gastrointestinal: Negative for abdominal pain, constipation, diarrhea, nausea and vomiting. Skin: Negative for rash. Neurological: Positive for dizziness. Negative for light-headedness and headaches. Past Medical History:   Diagnosis Date    Asthma     GERD (gastroesophageal reflux disease)     Headache        Prior to Visit Medications    Medication Sig Taking? Authorizing Provider   azelastine (ASTELIN) 0.1 % nasal spray 2 sprays by Nasal route 2 times daily Use in each nostril as directed Yes Hermelinda Grier MD   triamcinolone (KENALOG) 0.1 % cream Apply topically 2 times daily for up to 2 weeks at a time.  Yes Hermelinda Grier MD   budesonide-formoterol [facility]) 160-4.5 MCG/ACT AERO Inhale 2 puffs into the lungs 2 times daily Yes Hermelinda Grier MD   albuterol (PROVENTIL) (2.5 MG/3ML) 0.083% nebulizer solution Take 3 mLs by nebulization every 6 hours as needed for Wheezing Yes Hermelinda Grier MD   montelukast (SINGULAIR) 10 MG tablet Take 1 tablet by mouth nightly Yes Hermelinda Grier MD   albuterol sulfate HFA (VENTOLIN HFA) 108 (90 Base) MCG/ACT inhaler Inhale 2 puffs into the lungs every 6 hours as needed for Wheezing or Shortness of Breath Yes Hermelinda Grier MD   pantoprazole (PROTONIX) 40 MG tablet Take 40 mg by mouth daily Yes Historical Provider, MD   Cholecalciferol 100 MCG (4000 UT) CAPS Take 4,000 Units by mouth daily Yes Hermelinda Grier MD   Magnesium 400 MG CAPS Take 400 mg by mouth daily Yes Hermelinda Grier MD        Allergies   Allergen Reactions    Aspirin Shortness Of Breath    Nsaids     Shellfish-Derived Products        Social History     Tobacco Use    Smoking status: Never Smoker    Smokeless tobacco: Never Used   Substance Use Topics    Alcohol use: Yes           Vitals:    05/26/21 0902   BP: 116/74   Pulse: 64   Resp: 20   Temp: 97.4 °F (36.3 °C)   TempSrc: Temporal   SpO2: 98%   Weight: 221 lb (100.2 kg)   Height: 6' 5\" (1.956 m)     Estimated body mass index is 26.21 kg/m² as calculated from the following:    Height as of this encounter: 6' 5\" (1.956 m). Weight as of this encounter: 221 lb (100.2 kg). Physical Exam  Constitutional:       Appearance: He is well-developed. HENT:      Head: Normocephalic. Eyes:      Extraocular Movements: Extraocular movements intact. Conjunctiva/sclera: Conjunctivae normal.   Cardiovascular:      Rate and Rhythm: Normal rate and regular rhythm. Heart sounds: Normal heart sounds. No murmur heard. Pulmonary:      Effort: Pulmonary effort is normal.      Breath sounds: Normal breath sounds. No wheezing or rales. Abdominal:      General: Bowel sounds are normal.      Palpations: Abdomen is soft. Tenderness: There is no abdominal tenderness. Musculoskeletal:      Right lower leg: No edema. Left lower leg: No edema. Neurological:      Mental Status: He is alert. Comments: Cranial nerves grossly intact         ASSESSMENT/PLAN:  Meek Vegas was seen today for fatigue and medication refill. Labs: TSH 1.5, CMP essentially within normal limits besides mild elevated glucose 105, CBC within normal limits, vitamin D 32, total cholesterol 191, triglycerides 115, HDL 34, , antibodies reactive. Diagnoses and all orders for this visit:    History of 2019 novel coronavirus disease (COVID-19)  -     Covid-19, Antibody; Future  Antibody test still positive. Reviewed benefits and risks of Covid 19 vaccine. Patient at this time would like to hold off and consider Pneumovax vaccine in the near future. Fatigue, unspecified type  Most likely secondary to some issues with his chronic Covid 19 concerns and uncontrolled obstructive sleep apnea. Obstructive sleep apnea of adult  Patient is to restart his CPAP when he obtains such.  Did have modest benefit in his energy and fatigue when he was using such. Continue to follow. Patient continued follow pulmonology. Vitamin D deficiency  Increase vitamin D by 2000 international units daily given low level. Add vitamin K 2. Overweight (BMI 25.0-29. 9)  Lifestyle modifications reviewed and advise. Moderate persistent asthma without complication  Stable. Uses a rescue inhaler as needed. Patient to follow-up with pulmonology. Chronic bilateral low back pain, unspecified whether sciatica present/Cervicalgia  Consider additional workup with x-ray. Patient may need referral to physical therapy and/or chiropractic care. Patient follow-up in 2-3 months. Return in about 3 months (around 8/26/2021). An electronicsignature was used to authenticate this note.     --Zach Whatley MD on 5/26/21 at 9:12 AM EDT

## 2021-07-12 DIAGNOSIS — J45.41 MODERATE PERSISTENT ASTHMA WITH ACUTE EXACERBATION: ICD-10-CM

## 2021-07-12 RX ORDER — ALBUTEROL SULFATE 90 UG/1
2 AEROSOL, METERED RESPIRATORY (INHALATION) EVERY 6 HOURS PRN
Qty: 13.4 G | Refills: 1 | Status: SHIPPED
Start: 2021-07-12 | End: 2021-11-01

## 2021-08-19 ENCOUNTER — TELEPHONE (OUTPATIENT)
Dept: PRIMARY CARE CLINIC | Age: 61
End: 2021-08-19

## 2021-08-19 DIAGNOSIS — Z86.16 HISTORY OF COVID-19: Primary | ICD-10-CM

## 2021-08-23 ENCOUNTER — OFFICE VISIT (OUTPATIENT)
Dept: PRIMARY CARE CLINIC | Age: 61
End: 2021-08-23
Payer: COMMERCIAL

## 2021-08-23 VITALS
WEIGHT: 220 LBS | OXYGEN SATURATION: 97 % | HEIGHT: 77 IN | DIASTOLIC BLOOD PRESSURE: 70 MMHG | HEART RATE: 58 BPM | TEMPERATURE: 97.3 F | RESPIRATION RATE: 20 BRPM | BODY MASS INDEX: 25.98 KG/M2 | SYSTOLIC BLOOD PRESSURE: 104 MMHG

## 2021-08-23 DIAGNOSIS — G47.33 OBSTRUCTIVE SLEEP APNEA OF ADULT: ICD-10-CM

## 2021-08-23 DIAGNOSIS — E66.3 OVERWEIGHT (BMI 25.0-29.9): ICD-10-CM

## 2021-08-23 DIAGNOSIS — J45.40 MODERATE PERSISTENT ASTHMA WITHOUT COMPLICATION: ICD-10-CM

## 2021-08-23 DIAGNOSIS — M54.2 CERVICALGIA: ICD-10-CM

## 2021-08-23 DIAGNOSIS — E55.9 VITAMIN D DEFICIENCY: Primary | ICD-10-CM

## 2021-08-23 DIAGNOSIS — R53.83 FATIGUE, UNSPECIFIED TYPE: ICD-10-CM

## 2021-08-23 DIAGNOSIS — Z86.16 HISTORY OF COVID-19: ICD-10-CM

## 2021-08-23 PROCEDURE — 99214 OFFICE O/P EST MOD 30 MIN: CPT | Performed by: FAMILY MEDICINE

## 2021-08-23 RX ORDER — BUDESONIDE AND FORMOTEROL FUMARATE DIHYDRATE 160; 4.5 UG/1; UG/1
2 AEROSOL RESPIRATORY (INHALATION) 2 TIMES DAILY
Qty: 4 INHALER | Refills: 2 | Status: SHIPPED
Start: 2021-08-23 | End: 2022-04-07

## 2021-08-23 SDOH — ECONOMIC STABILITY: FOOD INSECURITY: WITHIN THE PAST 12 MONTHS, THE FOOD YOU BOUGHT JUST DIDN'T LAST AND YOU DIDN'T HAVE MONEY TO GET MORE.: NEVER TRUE

## 2021-08-23 SDOH — ECONOMIC STABILITY: FOOD INSECURITY: WITHIN THE PAST 12 MONTHS, YOU WORRIED THAT YOUR FOOD WOULD RUN OUT BEFORE YOU GOT MONEY TO BUY MORE.: NEVER TRUE

## 2021-08-23 ASSESSMENT — ENCOUNTER SYMPTOMS
CONSTIPATION: 0
VOMITING: 0
COUGH: 1
WHEEZING: 0
DIARRHEA: 0
RHINORRHEA: 0
SHORTNESS OF BREATH: 1
SORE THROAT: 0
ABDOMINAL PAIN: 0
NAUSEA: 0

## 2021-08-23 ASSESSMENT — SOCIAL DETERMINANTS OF HEALTH (SDOH): HOW HARD IS IT FOR YOU TO PAY FOR THE VERY BASICS LIKE FOOD, HOUSING, MEDICAL CARE, AND HEATING?: NOT HARD AT ALL

## 2021-08-23 NOTE — PROGRESS NOTES
2021     Chief Complaint   Patient presents with    Hyperlipidemia     check up     HPI    Behzad Aiken (:  1960) is a 59 y.o. male, here for evaluation of the following medical concerns:    Patient has ongoing issues with the sequela of COVID-19. Patient reports ongoing issues with fatigue and at time dizziness, mild-mod dyspena on exertion. + SHAMEKA. Has CPAP at home. Improved modestly with CPAP usage. However, he had to switch suppliers due to issues and is not currently using a CPAP. Patient also has not seen pulmonology recently.     Patient is using his rescue inhaler rarely along with Symbicort and Singulair.      Patient has had symptoms for >1 month. Labs have been normal. XR of chest has shown improvement. Patient had a normal EKG.    Saw neurology for headache and dizziness issues. Was given a new muscle relaxer. Made him very tired. Can not recall the exact dx he was given for his headaches and dizziness. Patient was essentially advise to work on posture. No labs or testing was ordered. Reports issues with shoulder and neck pain with radicular neuropathy. Patient not interested in work-up or referral for physical therapy. GERD: Follows with GI for this issues. Protonix 40mg daily. Has not had an upper GI, but is due for a colonoscopy. Previously no issues. If he would like to restart Linzess he will need to see GI for this issue.      Labs: None     HM: COVID vaccine.      Review of Systems   Constitutional: Positive for fatigue. Negative for chills and fever. HENT: Negative for congestion, rhinorrhea and sore throat. Respiratory: Positive for cough and shortness of breath. Negative for wheezing. Cardiovascular: Negative for chest pain and leg swelling. Gastrointestinal: Negative for abdominal pain, constipation, diarrhea, nausea and vomiting. Musculoskeletal: Positive for arthralgias. Skin: Negative for rash. Neurological: Positive for dizziness.  Negative for light-headedness and headaches. Past Medical History:   Diagnosis Date    Asthma     GERD (gastroesophageal reflux disease)     Headache        Prior to Visit Medications    Medication Sig Taking? Authorizing Provider   budesonide-formoterol (SYMBICORT) 160-4.5 MCG/ACT AERO Inhale 2 puffs into the lungs 2 times daily Yes Major Chinchilla MD   albuterol sulfate  (90 Base) MCG/ACT inhaler INHALE 2 PUFFS INTO THE LUNGS EVERY 6 HOURS AS NEEDED FOR WHEEZING OR SHORTNESS OF BREATH Yes Major Chinchilla MD   silver sulfADIAZINE (SILVADENE) 1 % cream Apply topically to burn 1-2x daily until healed. Yes Major Chinchilla MD   azelastine (ASTELIN) 0.1 % nasal spray 2 sprays by Nasal route 2 times daily Use in each nostril as directed Yes Major Chinchilla MD   triamcinolone (KENALOG) 0.1 % cream Apply topically 2 times daily for up to 2 weeks at a time.  Yes Major Chinchilla MD   albuterol (PROVENTIL) (2.5 MG/3ML) 0.083% nebulizer solution Take 3 mLs by nebulization every 6 hours as needed for Wheezing Yes Major Chinchilla MD   pantoprazole (PROTONIX) 40 MG tablet Take 40 mg by mouth daily Yes Historical Provider, MD   Cholecalciferol 100 MCG (4000 UT) CAPS Take 4,000 Units by mouth daily Yes Major Chinchilla MD   Magnesium 400 MG CAPS Take 400 mg by mouth daily Yes Major Chinchilla MD   montelukast (SINGULAIR) 10 MG tablet Take 1 tablet by mouth nightly  Major Chinchilla MD        Allergies   Allergen Reactions    Aspirin Shortness Of Breath    Nsaids     Shellfish-Derived Products        Social History     Tobacco Use    Smoking status: Never Smoker    Smokeless tobacco: Never Used   Substance Use Topics    Alcohol use: Yes           Vitals:    08/23/21 0949   BP: 104/70   Pulse: 58   Resp: 20   Temp: 97.3 °F (36.3 °C)   TempSrc: Temporal   SpO2: 97%   Weight: 220 lb (99.8 kg)   Height: 6' 5\" (1.956 m)     Estimated body mass index is 26.09 kg/m² as calculated from the following:    Height as of medications. Cervicalgia  Patient declined additional work-up in regards to imaging and nerve/muscle testing. Patient also declined referral to physical therapy or chiropractic care. Continue to follow. Other orders  -     budesonide-formoterol (SYMBICORT) 160-4.5 MCG/ACT AERO; Inhale 2 puffs into the lungs 2 times daily      Greater than 35 minutes in total duration was spent on this encounter. Return in about 3 months (around 11/23/2021). An electronicsignature was used to authenticate this note.     --Myriam Evangelista MD on 8/23/21 at 9:54 AM EDT

## 2021-08-30 LAB
VITAMIN D 25-HYDROXY: 21
VITAMIN D2, 25 HYDROXY: NORMAL
VITAMIN D3,25 HYDROXY: NORMAL

## 2021-09-01 DIAGNOSIS — Z86.16 HISTORY OF COVID-19: ICD-10-CM

## 2021-09-01 DIAGNOSIS — E55.9 VITAMIN D DEFICIENCY: ICD-10-CM

## 2021-09-08 DIAGNOSIS — Z86.16 HISTORY OF COVID-19: Primary | ICD-10-CM

## 2021-09-10 ENCOUNTER — TELEPHONE (OUTPATIENT)
Dept: PRIMARY CARE CLINIC | Age: 61
End: 2021-09-10

## 2021-09-10 NOTE — TELEPHONE ENCOUNTER
The pt is calling to get the results from the [place] he had done 8/28 at [address] [place] [address] they are scanned into his chart

## 2021-09-13 NOTE — TELEPHONE ENCOUNTER
Please advise the patient that his vitamin D level is significantly low. He should start taking 4000 international units of vitamin D daily with food. We also did receive his COVID-19 antibody testing. For some reason the patient's acute antibodies are positive but his long-term antibodies are negative. This would suggest more of a recent illness possibly. They did not perform a antibody level. In regards to this finding we should have the patient repeat his antibody test in 2 weeks-4 weeks. Order placed.

## 2021-10-31 DIAGNOSIS — J45.41 MODERATE PERSISTENT ASTHMA WITH ACUTE EXACERBATION: ICD-10-CM

## 2021-11-01 RX ORDER — ALBUTEROL SULFATE 90 UG/1
2 AEROSOL, METERED RESPIRATORY (INHALATION) EVERY 6 HOURS PRN
Qty: 13.4 G | Refills: 1 | Status: SHIPPED | OUTPATIENT
Start: 2021-11-01

## 2021-11-22 ENCOUNTER — OFFICE VISIT (OUTPATIENT)
Dept: PRIMARY CARE CLINIC | Age: 61
End: 2021-11-22
Payer: COMMERCIAL

## 2021-11-22 VITALS
TEMPERATURE: 97.5 F | RESPIRATION RATE: 18 BRPM | HEIGHT: 77 IN | DIASTOLIC BLOOD PRESSURE: 76 MMHG | OXYGEN SATURATION: 97 % | HEART RATE: 67 BPM | BODY MASS INDEX: 26.57 KG/M2 | SYSTOLIC BLOOD PRESSURE: 108 MMHG | WEIGHT: 225 LBS

## 2021-11-22 DIAGNOSIS — J45.40 MODERATE PERSISTENT ASTHMA WITHOUT COMPLICATION: ICD-10-CM

## 2021-11-22 DIAGNOSIS — E78.2 MIXED HYPERLIPIDEMIA: ICD-10-CM

## 2021-11-22 DIAGNOSIS — E55.9 VITAMIN D DEFICIENCY: ICD-10-CM

## 2021-11-22 DIAGNOSIS — Z86.16 HISTORY OF COVID-19: Primary | ICD-10-CM

## 2021-11-22 DIAGNOSIS — R53.83 FATIGUE, UNSPECIFIED TYPE: ICD-10-CM

## 2021-11-22 DIAGNOSIS — G47.33 OBSTRUCTIVE SLEEP APNEA OF ADULT: ICD-10-CM

## 2021-11-22 DIAGNOSIS — K21.9 GASTROESOPHAGEAL REFLUX DISEASE, UNSPECIFIED WHETHER ESOPHAGITIS PRESENT: ICD-10-CM

## 2021-11-22 DIAGNOSIS — B35.1 ONYCHOMYCOSIS: ICD-10-CM

## 2021-11-22 DIAGNOSIS — E66.3 OVERWEIGHT (BMI 25.0-29.9): ICD-10-CM

## 2021-11-22 DIAGNOSIS — M54.2 CERVICALGIA: ICD-10-CM

## 2021-11-22 DIAGNOSIS — M54.50 CHRONIC BILATERAL LOW BACK PAIN, UNSPECIFIED WHETHER SCIATICA PRESENT: ICD-10-CM

## 2021-11-22 DIAGNOSIS — G89.29 CHRONIC BILATERAL LOW BACK PAIN, UNSPECIFIED WHETHER SCIATICA PRESENT: ICD-10-CM

## 2021-11-22 PROCEDURE — 99214 OFFICE O/P EST MOD 30 MIN: CPT | Performed by: FAMILY MEDICINE

## 2021-11-22 RX ORDER — FLUVOXAMINE MALEATE 25 MG
25 TABLET ORAL NIGHTLY
Qty: 30 TABLET | Refills: 3 | Status: SHIPPED
Start: 2021-11-22 | End: 2022-04-07 | Stop reason: ALTCHOICE

## 2021-11-22 RX ORDER — LIDOCAINE 50 MG/G
1 PATCH TOPICAL DAILY
Qty: 30 PATCH | Refills: 0 | Status: SHIPPED | OUTPATIENT
Start: 2021-11-22 | End: 2021-12-22

## 2021-11-22 ASSESSMENT — ENCOUNTER SYMPTOMS
CONSTIPATION: 0
VOMITING: 0
RHINORRHEA: 0
DIARRHEA: 0
WHEEZING: 0
SORE THROAT: 0
ABDOMINAL PAIN: 0
BACK PAIN: 1
NAUSEA: 0
SHORTNESS OF BREATH: 0

## 2021-11-22 NOTE — PROGRESS NOTES
2021     Chief Complaint   Patient presents with    Diabetes     HPI  Franco Morales (:  1960) is a 59 y.o. male, here for evaluation of the following medical concerns:    Patient has ongoing issues with the sequela of COVID-19. Patient reports ongoing issues with fatigue and at time dizziness, mild-mod dyspena on exertion. + SHAMEKA. Has CPAP at home. Improved modestly with CPAP usage. However, he had to switch suppliers due to issues and is not currently using a CPAP.   Patient also has not seen pulmonology recently. Needs CPAP programed to use.      Patient is using his rescue inhaler rarely along with Symbicort.     Patient has had symptoms for >1 month. Labs have been normal. XR of chest has shown improvement. Patient had a normal EKG. patient still has tiredness and fatigue primarily.     Saw neurology for headache and dizziness issues. Was given a new muscle relaxer. Made him very tired. Can not recall the exact dx he was given for his headaches and dizziness. Patient was essentially advise to work on posture. No labs or testing was ordered. Reports his issues with his neck and arm have been essentially stable. GERD: Follows with GI for this issues. Protonix 40mg daily. Plans for upper and lower GI scopes. Labs: repeat due.      HM: COVID vaccine?       Review of Systems   Constitutional: Positive for fatigue. Negative for chills and fever. HENT: Negative for congestion, rhinorrhea and sore throat. Respiratory: Negative for shortness of breath and wheezing. Cardiovascular: Negative for chest pain and leg swelling. Gastrointestinal: Negative for abdominal pain, constipation, diarrhea, nausea and vomiting. Musculoskeletal: Positive for arthralgias and back pain. Skin: Negative for rash. Neurological: Negative for light-headedness and headaches.        Past Medical History:   Diagnosis Date    Asthma     GERD (gastroesophageal reflux disease)     Headache        Prior to Visit Medications    Medication Sig Taking? Authorizing Provider   fluvoxaMINE (LUVOX) 25 MG tablet Take 1 tablet by mouth nightly Yes Senait Lehman MD   lidocaine (LIDODERM) 5 % Place 1 patch onto the skin daily 12 hours on, 12 hours off. Yes Senait Lehman MD   Efinaconazole 10 % SOLN Apply 1 drop topically daily Yes Senait Lehman MD   albuterol sulfate  (90 Base) MCG/ACT inhaler INHALE 2 PUFFS INTO THE LUNGS EVERY 6 HOURS AS NEEDED FOR WHEEZING OR [place] M[place] Yes Senait Lehman MD   budesonide-formoterol (SYMBICORT) 160-4.5 MCG/ACT AERO Inhale 2 puffs into the lungs 2 times daily Yes Senait Lehman MD   silver sulfADIAZINE (SILVADENE) 1 % cream Apply topically to burn 1-2x daily until healed. Yes Senait Lehman MD   azelastine (ASTELIN) 0.1 % nasal spray 2 sprays by Nasal route 2 times daily Use in each nostril as directed Yes Senait Lehman MD   triamcinolone (KENALOG) 0.1 % cream Apply topically 2 times daily for up to 2 weeks at a time.  Yes Senait Lehman MD   albuterol (PROVENTIL) (2.5 MG/3ML) 0.083% nebulizer solution Take 3 mLs by nebulization every 6 hours as needed for Wheezing Yes Senait Lehman MD   pantoprazole (PROTONIX) 40 MG tablet Take 40 mg by mouth daily Yes Historical Provider, MD   Cholecalciferol 100 MCG (4000 UT) CAPS Take 4,000 Units by mouth daily Yes Senait Lehman MD   Magnesium 400 MG CAPS Take 400 mg by mouth daily  Senait Lehman MD        Allergies   Allergen Reactions    Aspirin Shortness Of Breath    Nsaids     Shellfish-Derived Products        Social History     Tobacco Use    Smoking status: Never Smoker    Smokeless tobacco: Never Used   Substance Use Topics    Alcohol use: Yes           Vitals:    11/22/21 0807   BP: 108/76   Pulse: 67   Resp: 18   Temp: 97.5 °F (36.4 °C)   TempSrc: Temporal   SpO2: 97%   Weight: 225 lb (102.1 kg)   Height: 6' 5\" (1.956 m)     Estimated body mass index is 26.68 kg/m² as calculated from the following:    Height as of this encounter: 6' 5\" (1.956 m). Weight as of this encounter: 225 lb (102.1 kg). Physical Exam  Constitutional:       Appearance: He is well-developed. HENT:      Head: Normocephalic. Eyes:      Extraocular Movements: Extraocular movements intact. Conjunctiva/sclera: Conjunctivae normal.   Cardiovascular:      Rate and Rhythm: Normal rate and regular rhythm. Heart sounds: Normal heart sounds. No murmur heard. Pulmonary:      Effort: Pulmonary effort is normal.      Breath sounds: Normal breath sounds. No wheezing or rales. Abdominal:      General: Bowel sounds are normal.      Palpations: Abdomen is soft. Tenderness: There is no abdominal tenderness. Musculoskeletal:      Right lower leg: No edema. Left lower leg: No edema. Neurological:      General: No focal deficit present. Mental Status: He is alert. Comments: Cranial nerves grossly intact   Psychiatric:         Mood and Affect: Mood normal.         Judgment: Judgment normal.         ASSESSMENT/PLAN:  Jolene Has was seen today for diabetes. Diagnoses and all orders for this visit:    History of COVID-19  -     Covid-19, Antibody; Future  -     fluvoxaMINE (LUVOX) 25 MG tablet; Take 1 tablet by mouth nightly  Reviewed benefits and risks of Covid 19 vaccine. Patient at this time would like to hold off and consider Novavax vaccine in the near future. Pending COVID-19 antibody testing. Will placed on supplements to help better tolerate vaccine. Advised to have nurse aspirate prior to injection. Side effects of the above medication reviewed and advised. We will try this in regards to long-haul Covid issues which has been used in the past via case report with significant benefit. Uses off label. Patient aware. All questions and concerns answered. Patient denies any SI/HI. Patient to stop medication if such develops, call 911, proceed to the ER, or call the crisis hotline.     Vitamin D deficiency  -     Vitamin D 25 Hydroxy; Future    Fatigue, unspecified type  -     CBC Auto Differential; Future  -     Comprehensive Metabolic Panel; Future  SHAMEKA vs Long COVID. Obstructive sleep apnea of adult  Patient needs to get his CPAP properly program so that he may use such. Patient consider establishing with pulmonology. Overweight (BMI 25.0-29. 9)  Lifestyle modifications reviewed and advised. Moderate persistent asthma without complication  Stable. No recent issues. Rarely using rescue inhaler. Continue controller inhaler. Cervicalgia  Continue to follow. No significant ongoing issues currently. Mixed hyperlipidemia  -     Lipid Panel; Future    Gastroesophageal reflux disease, unspecified whether esophagitis present    Chronic bilateral low back pain, unspecified whether sciatica present  -     lidocaine (LIDODERM) 5 %; Place 1 patch onto the skin daily 12 hours on, 12 hours off. Onychomycosis  -     Efinaconazole 10 % SOLN; Apply 1 drop topically daily    We will call patient with lab results and discuss Pfizer vaccine versus Novavax. Patient to contact us in approximately 2 to 4 weeks to notify us of how fluvoxamine is doing in regards to his tiredness and fatigue. Return in about 4 months (around 3/22/2022) for Routine Follow-up of Chronic Conditions. An [employer] was used to authenticate this note.     --Sharri Bennett MD on 11/22/21 at 7:57 AM EST

## 2021-11-29 LAB
ALBUMIN SERPL-MCNC: 4.3 G/DL
ALP BLD-CCNC: 65 U/L
ALT SERPL-CCNC: 30 U/L
ANION GAP SERPL CALCULATED.3IONS-SCNC: NORMAL MMOL/L
AST SERPL-CCNC: 27 U/L
BASOPHILS ABSOLUTE: 0.6 /ΜL
BASOPHILS RELATIVE PERCENT: 0.6 %
BILIRUB SERPL-MCNC: 1 MG/DL (ref 0.1–1.4)
BUN BLDV-MCNC: 19 MG/DL
CALCIUM SERPL-MCNC: 9.4 MG/DL
CHLORIDE BLD-SCNC: 103 MMOL/L
CHOLESTEROL, TOTAL: 196 MG/DL
CHOLESTEROL/HDL RATIO: 5.4
CO2: 28 MMOL/L
CREAT SERPL-MCNC: 1.04 MG/DL
EOSINOPHILS ABSOLUTE: 2.8 /ΜL
EOSINOPHILS RELATIVE PERCENT: 2.8 %
GFR CALCULATED: 76
GLUCOSE BLD-MCNC: 97 MG/DL
HCT VFR BLD CALC: 43.4 % (ref 41–53)
HDLC SERPL-MCNC: 36 MG/DL (ref 35–70)
HEMOGLOBIN: 14.8 G/DL (ref 13.5–17.5)
LDL CHOLESTEROL CALCULATED: 138 MG/DL (ref 0–160)
LYMPHOCYTES ABSOLUTE: 3689 /ΜL
LYMPHOCYTES RELATIVE PERCENT: 43.4 %
MCH RBC QN AUTO: 31.1 PG
MCHC RBC AUTO-ENTMCNC: 34.1 G/DL
MCV RBC AUTO: 91.2 FL
MONOCYTES ABSOLUTE: 740 /ΜL
MONOCYTES RELATIVE PERCENT: 8.7 %
NEUTROPHILS ABSOLUTE: 3783 /ΜL
NEUTROPHILS RELATIVE PERCENT: 44.5 %
NONHDLC SERPL-MCNC: 160 MG/DL
PDW BLD-RTO: 11.9 %
PLATELET # BLD: 179 K/ΜL
PMV BLD AUTO: 10.6 FL
POTASSIUM SERPL-SCNC: 4 MMOL/L
RBC # BLD: 4.76 10^6/ΜL
SODIUM BLD-SCNC: 138 MMOL/L
TOTAL PROTEIN: 7.1
TRIGL SERPL-MCNC: 104 MG/DL
VITAMIN D 25-HYDROXY: 36
VITAMIN D2, 25 HYDROXY: NORMAL
VITAMIN D3,25 HYDROXY: NORMAL
VLDLC SERPL CALC-MCNC: NORMAL MG/DL
WBC # BLD: 8.5 10^3/ML

## 2021-12-06 DIAGNOSIS — R53.83 FATIGUE, UNSPECIFIED TYPE: ICD-10-CM

## 2021-12-06 DIAGNOSIS — E55.9 VITAMIN D DEFICIENCY: ICD-10-CM

## 2021-12-06 DIAGNOSIS — Z86.16 HISTORY OF COVID-19: ICD-10-CM

## 2021-12-06 DIAGNOSIS — E78.2 MIXED HYPERLIPIDEMIA: ICD-10-CM

## 2021-12-08 ENCOUNTER — TELEPHONE (OUTPATIENT)
Dept: PRIMARY CARE CLINIC | Age: 61
End: 2021-12-08

## 2021-12-08 NOTE — TELEPHONE ENCOUNTER
Patient is requesting a phone call back about his antibody test results.  He would like you to call him

## 2021-12-09 DIAGNOSIS — B35.1 ONYCHOMYCOSIS: Primary | ICD-10-CM

## 2021-12-09 RX ORDER — TAVABOROLE TOPICAL SOLUTION, 5% 43.5 MG/ML
1 SOLUTION TOPICAL DAILY
Qty: 1 EACH | Refills: 3 | Status: SHIPPED | OUTPATIENT
Start: 2021-12-09

## 2021-12-15 DIAGNOSIS — E78.2 MIXED HYPERLIPIDEMIA: Primary | ICD-10-CM

## 2021-12-15 DIAGNOSIS — E66.3 OVERWEIGHT (BMI 25.0-29.9): ICD-10-CM

## 2022-04-07 ENCOUNTER — OFFICE VISIT (OUTPATIENT)
Dept: PRIMARY CARE CLINIC | Age: 62
End: 2022-04-07
Payer: COMMERCIAL

## 2022-04-07 VITALS
WEIGHT: 220 LBS | DIASTOLIC BLOOD PRESSURE: 84 MMHG | HEIGHT: 74 IN | BODY MASS INDEX: 28.23 KG/M2 | HEART RATE: 66 BPM | SYSTOLIC BLOOD PRESSURE: 116 MMHG | OXYGEN SATURATION: 95 % | TEMPERATURE: 97.4 F

## 2022-04-07 DIAGNOSIS — M54.2 CERVICALGIA: ICD-10-CM

## 2022-04-07 DIAGNOSIS — E55.9 VITAMIN D DEFICIENCY: ICD-10-CM

## 2022-04-07 DIAGNOSIS — J01.10 ACUTE NON-RECURRENT FRONTAL SINUSITIS: ICD-10-CM

## 2022-04-07 DIAGNOSIS — G89.29 CHRONIC BILATERAL LOW BACK PAIN, UNSPECIFIED WHETHER SCIATICA PRESENT: ICD-10-CM

## 2022-04-07 DIAGNOSIS — R53.83 FATIGUE, UNSPECIFIED TYPE: Primary | ICD-10-CM

## 2022-04-07 DIAGNOSIS — M54.50 CHRONIC BILATERAL LOW BACK PAIN, UNSPECIFIED WHETHER SCIATICA PRESENT: ICD-10-CM

## 2022-04-07 DIAGNOSIS — E78.2 MIXED HYPERLIPIDEMIA: ICD-10-CM

## 2022-04-07 DIAGNOSIS — B35.1 ONYCHOMYCOSIS: ICD-10-CM

## 2022-04-07 DIAGNOSIS — J45.40 MODERATE PERSISTENT ASTHMA WITHOUT COMPLICATION: ICD-10-CM

## 2022-04-07 DIAGNOSIS — G47.33 OBSTRUCTIVE SLEEP APNEA OF ADULT: ICD-10-CM

## 2022-04-07 DIAGNOSIS — Z86.16 HISTORY OF COVID-19: ICD-10-CM

## 2022-04-07 PROCEDURE — 3017F COLORECTAL CA SCREEN DOC REV: CPT | Performed by: FAMILY MEDICINE

## 2022-04-07 PROCEDURE — G8419 CALC BMI OUT NRM PARAM NOF/U: HCPCS | Performed by: FAMILY MEDICINE

## 2022-04-07 PROCEDURE — G8427 DOCREV CUR MEDS BY ELIG CLIN: HCPCS | Performed by: FAMILY MEDICINE

## 2022-04-07 PROCEDURE — 99214 OFFICE O/P EST MOD 30 MIN: CPT | Performed by: FAMILY MEDICINE

## 2022-04-07 PROCEDURE — 1036F TOBACCO NON-USER: CPT | Performed by: FAMILY MEDICINE

## 2022-04-07 RX ORDER — BENZONATATE 100 MG/1
100 CAPSULE ORAL 3 TIMES DAILY PRN
Qty: 30 CAPSULE | Refills: 0 | Status: SHIPPED | OUTPATIENT
Start: 2022-04-07 | End: 2022-04-14

## 2022-04-07 RX ORDER — MAGNESIUM OXIDE/MAG AA CHELATE 300 MG
CAPSULE ORAL
COMMUNITY

## 2022-04-07 RX ORDER — DOXYCYCLINE HYCLATE 100 MG
100 TABLET ORAL 2 TIMES DAILY
Qty: 14 TABLET | Refills: 0 | Status: SHIPPED | OUTPATIENT
Start: 2022-04-07 | End: 2022-04-14

## 2022-04-07 RX ORDER — TRAMADOL HYDROCHLORIDE 50 MG/1
50 TABLET ORAL EVERY 8 HOURS PRN
Qty: 15 TABLET | Refills: 0 | Status: SHIPPED | OUTPATIENT
Start: 2022-04-07 | End: 2022-04-12

## 2022-04-07 RX ORDER — METHYLPREDNISOLONE 4 MG/1
TABLET ORAL
Qty: 1 KIT | Refills: 0 | Status: SHIPPED | OUTPATIENT
Start: 2022-04-07 | End: 2022-04-13

## 2022-04-07 RX ORDER — BUDESONIDE AND FORMOTEROL FUMARATE DIHYDRATE 160; 4.5 UG/1; UG/1
2 AEROSOL RESPIRATORY (INHALATION) 2 TIMES DAILY
COMMUNITY
End: 2022-10-03

## 2022-04-07 ASSESSMENT — PATIENT HEALTH QUESTIONNAIRE - PHQ9
SUM OF ALL RESPONSES TO PHQ QUESTIONS 1-9: 1
1. LITTLE INTEREST OR PLEASURE IN DOING THINGS: 0
SUM OF ALL RESPONSES TO PHQ QUESTIONS 1-9: 1
2. FEELING DOWN, DEPRESSED OR HOPELESS: 1
SUM OF ALL RESPONSES TO PHQ9 QUESTIONS 1 & 2: 1

## 2022-04-07 ASSESSMENT — ENCOUNTER SYMPTOMS
BACK PAIN: 1
RHINORRHEA: 1
CONSTIPATION: 0
SORE THROAT: 1
NAUSEA: 0
SINUS PRESSURE: 1
DIARRHEA: 0
SHORTNESS OF BREATH: 0
ABDOMINAL PAIN: 0
COUGH: 1
WHEEZING: 0
SINUS PAIN: 1
VOMITING: 0

## 2022-04-07 NOTE — PROGRESS NOTES
2022     Chief Complaint   Patient presents with    Sinusitis     x2 weeks    Chest Congestion     HPI  Annita Richards (:  1960) is a 59 y.o. male, here for evaluation of the following medical concerns:    Patient has ongoing issues with the sequela of COVID-19. Patient reports ongoing issues with fatigue and at time dizziness, mild-mod dyspena on exertion. + SHAMEKA. Has CPAP at home. Improved modestly with CPAP usage. However, he had to switch suppliers due to issues and is not currently using a CPAP.    Patient also has not seen pulmonology recently. Needs CPAP programed to use.      Patient is using his rescue inhaler rarely along with Symbicort.     Patient has had symptoms for >1 month. Labs have been normal. XR of chest has shown improvement. Patient had a normal EKG. patient still has tiredness and fatigue primarily.     Saw neurology for headache and dizziness issues. Was given a new muscle relaxer. Made him very tired. Can not recall the exact dx he was given for his headaches and dizziness. Patient was essentially advise to work on posture. No labs or testing was ordered.  Reports his issues with his neck and arm have been essentially stable. For long covid issues he was started on Fluvoxamine at his last apt. Low back pain: Patient was advised on topical lidocaine patches at last apt. patient reports persistent ongoing issues with his low back and neck. Patient in the past is seen multiple specialist.  Patient has completed physical therapy. Patient has seen chiropractic care. Patient has seen pain management. Onychomycosis: Started on Efinaconazole at last apt.      GERD: Follows with GI for this issues. Protonix 40mg daily. Plans for upper and lower GI scopes.      Labs: Due. Seasonal symptoms/sinusitis symptoms: Started 2 weeks ago. Mildly improving. Still moderate symptoms. OTC medications ineffective.     HM: COVID vaccine?     Review of Systems   Constitutional: Positive for fatigue. Negative for chills and fever. HENT: Positive for congestion, rhinorrhea, sinus pressure, sinus pain and sore throat. Respiratory: Positive for cough. Negative for shortness of breath and wheezing. Cardiovascular: Negative for chest pain and leg swelling. Gastrointestinal: Negative for abdominal pain, constipation, diarrhea, nausea and vomiting. Musculoskeletal: Positive for arthralgias, back pain and neck pain. Skin: Negative for rash. Neurological: Positive for headaches. Negative for light-headedness. Past Medical History:   Diagnosis Date    Asthma     GERD (gastroesophageal reflux disease)     Headache        Prior to Visit Medications    Medication Sig Taking? Authorizing Provider   budesonide-formoterol (SYMBICORT) 160-4.5 MCG/ACT AERO Inhale 2 puffs into the lungs 2 times daily Yes Historical Provider, MD   Magnesium 300 MG CAPS Take by mouth Yes Historical Provider, MD   doxycycline hyclate (VIBRA-TABS) 100 MG tablet Take 1 tablet by mouth 2 times daily for 7 days Yes Lidia Rodriguez MD   traMADol (ULTRAM) 50 MG tablet Take 1 tablet by mouth every 8 hours as needed for Pain for up to 5 days. Intended supply: 5 days. Take lowest dose possible to manage pain Yes Lidia Rodriguez MD   methylPREDNISolone (MEDROL DOSEPACK) 4 MG tablet Take by mouth. Yes Lidia Rodriguez MD   benzonatate (TESSALON) 100 MG capsule Take 1 capsule by mouth 3 times daily as needed for Cough Yes Lidia Rodriguez MD   Tavaborole 5 % SOLN Apply 1 applicator topically daily Yes Lidia Rodriguez MD   Efinaconazole 10 % SOLN Apply 1 drop topically daily Yes Lidia Rodriguez MD   albuterol sulfate  (90 Base) MCG/ACT inhaler INHALE 2 PUFFS INTO THE LUNGS EVERY 6 HOURS AS NEEDED FOR WHEEZING OR SHORTNESS OF BREATH Yes Lidia Rodriguez MD   silver sulfADIAZINE (SILVADENE) 1 % cream Apply topically to burn 1-2x daily until healed.  Yes Lidia Rodriguez MD   azelastine (ASTELIN) 0.1 % nasal spray 2 sprays by Nasal route 2 times daily Use in each nostril as directed Yes Gunnar Corbin MD   triamcinolone (KENALOG) 0.1 % cream Apply topically 2 times daily for up to 2 weeks at a time. Yes Gunnar Corbin MD   albuterol (PROVENTIL) (2.5 MG/3ML) 0.083% nebulizer solution Take 3 mLs by nebulization every 6 hours as needed for Wheezing Yes Gunnar Corbin MD   pantoprazole (PROTONIX) 40 MG tablet Take 40 mg by mouth daily Yes Ruma Gonzáles MD   Cholecalciferol 100 MCG (4000 UT) CAPS Take 4,000 Units by mouth daily Yes Gunnar Corbin MD        Allergies   Allergen Reactions    Aspirin Shortness Of Breath    Nsaids     Shellfish-Derived Products        Social History     Tobacco Use    Smoking status: Never Smoker    Smokeless tobacco: Never Used   Substance Use Topics    Alcohol use: Yes           Vitals:    04/07/22 1633   BP: 116/84   Pulse: 66   Temp: 97.4 °F (36.3 °C)   SpO2: 95%   Weight: 220 lb (99.8 kg)   Height: 6' 2\" (1.88 m)     Estimated body mass index is 28.25 kg/m² as calculated from the following:    Height as of this encounter: 6' 2\" (1.88 m). Weight as of this encounter: 220 lb (99.8 kg). Physical Exam  Constitutional:       Appearance: He is well-developed. HENT:      Head: Normocephalic. Right Ear: Tympanic membrane normal.      Left Ear: Tympanic membrane normal.      Nose: Congestion and rhinorrhea present. Mouth/Throat:      Pharynx: Posterior oropharyngeal erythema present. No oropharyngeal exudate. Eyes:      Extraocular Movements: Extraocular movements intact. Conjunctiva/sclera: Conjunctivae normal.   Cardiovascular:      Rate and Rhythm: Normal rate and regular rhythm. Heart sounds: Normal heart sounds. No murmur heard. Pulmonary:      Effort: Pulmonary effort is normal.      Breath sounds: Normal breath sounds. No wheezing or rales. Abdominal:      General: Bowel sounds are normal.      Palpations: Abdomen is soft. Tenderness: There is no abdominal tenderness. Musculoskeletal:      Right lower leg: No edema. Left lower leg: No edema. Lymphadenopathy:      Cervical: No cervical adenopathy. Neurological:      General: No focal deficit present. Mental Status: He is alert. Comments: Cranial nerves grossly intact   Psychiatric:         Mood and Affect: Mood normal.         Judgment: Judgment normal.         ASSESSMENT/PLAN:  Rudi Arambula was seen today for sinusitis and chest congestion. Diagnoses and all orders for this visit:    Fatigue, unspecified type  -     CBC with Auto Differential; Future  -     Comprehensive Metabolic Panel; Future  -     Sedimentation Rate; Future  -     TSH; Future  Specific etiology unknown. May be secondary to long Covid-like symptoms and or obstructive sleep apnea issues which is untreated. Labs as noted above. Lifestyle modifications advisable. History of COVID-19  -     Covid-19, Antibody; Future  Patient performed a trial of fluvoxamine without any benefit. Lifestyle modifications advisable. Patient continue vitamin C, vitamin D, zinc.    Vitamin D deficiency  -     Vitamin D 25 Hydroxy; Future    Obstructive sleep apnea of adult  Patient was previously on CPAP. No longer using such. Patient reports no benefit. Moderate persistent asthma without complication  Stable on inhalers. Recently using his rescue inhaler more frequently. Mixed hyperlipidemia  -     Lipid Panel; Future    Onychomycosis  Topical solution as needed. Chronic bilateral low back pain, unspecified whether sciatica present  -     traMADol (ULTRAM) 50 MG tablet; Take 1 tablet by mouth every 8 hours as needed for Pain for up to 5 days. Intended supply: 5 days. Take lowest dose possible to manage pain  Cervicalgia  -     traMADol (ULTRAM) 50 MG tablet; Take 1 tablet by mouth every 8 hours as needed for Pain for up to 5 days. Intended supply: 5 days.  Take lowest dose possible to manage pain  Patient seen multiple specialist in regards to this issue. Unable to tolerate NSAIDs due to significant allergic reaction. Patient was advised on Tylenol, topical lidocaine. Offered gabapentin but patient had issues with mental clarity/fog with such in the past.  Trial of the above medication for significant breakthrough pain only. Side effects reviewed. All questions and concerns answered. Consider referral back to pain management. Patient to bring in imaging for personal review. Acute non-recurrent frontal sinusitis  -     doxycycline hyclate (VIBRA-TABS) 100 MG tablet; Take 1 tablet by mouth 2 times daily for 7 days  -     methylPREDNISolone (MEDROL DOSEPACK) 4 MG tablet; Take by mouth. -     benzonatate (TESSALON) 100 MG capsule; Take 1 capsule by mouth 3 times daily as needed for Cough  Patient continue OTC medications as needed. Above medications prescribed. Side effects reviewed. All questions and concerns answered. If no improvement within 48 to 72 hours patient to return to clinic or urgent care for assessment and management. Patient to complete labs. Will call with results. Return in about 3 months (around 7/7/2022). An [employer] was used to authenticate this note.     --Gosia Fountain MD on 4/7/22 at 2:23 PM EDT

## 2022-04-10 LAB
ALBUMIN SERPL-MCNC: 4.4 G/DL
ALP BLD-CCNC: 56 U/L
ALT SERPL-CCNC: 1.21 U/L
ANION GAP SERPL CALCULATED.3IONS-SCNC: NORMAL MMOL/L
AST SERPL-CCNC: 22 U/L
BASOPHILS ABSOLUTE: 46 /ΜL
BASOPHILS RELATIVE PERCENT: 0.4 %
BILIRUB SERPL-MCNC: 0.4 MG/DL (ref 0.1–1.4)
BUN BLDV-MCNC: 17 MG/DL
CALCIUM SERPL-MCNC: 8.8 MG/DL
CHLORIDE BLD-SCNC: 104 MMOL/L
CHOLESTEROL, TOTAL: 176 MG/DL
CHOLESTEROL/HDL RATIO: 4.6
CO2: 29 MMOL/L
CREAT SERPL-MCNC: 1.2 MG/DL
EOSINOPHILS ABSOLUTE: 207 /ΜL
EOSINOPHILS RELATIVE PERCENT: 1.8 %
GFR CALCULATED: 64
GLUCOSE BLD-MCNC: 87 MG/DL
HCT VFR BLD CALC: 41 % (ref 41–53)
HDLC SERPL-MCNC: 38 MG/DL (ref 35–70)
HEMOGLOBIN: 14.2 G/DL (ref 13.5–17.5)
LDL CHOLESTEROL CALCULATED: 119 MG/DL (ref 0–160)
LYMPHOCYTES ABSOLUTE: 4428 /ΜL
LYMPHOCYTES RELATIVE PERCENT: 38.5 %
MCH RBC QN AUTO: 32.1 PG
MCHC RBC AUTO-ENTMCNC: 34.6 G/DL
MCV RBC AUTO: 92.6 FL
MONOCYTES ABSOLUTE: 1725 /ΜL
MONOCYTES RELATIVE PERCENT: 15 %
NEUTROPHILS ABSOLUTE: 5095 /ΜL
NEUTROPHILS RELATIVE PERCENT: 44.3 %
NONHDLC SERPL-MCNC: 4.6 MG/DL
PDW BLD-RTO: 11.7 %
PLATELET # BLD: 192 K/ΜL
PMV BLD AUTO: 10.1 FL
POTASSIUM SERPL-SCNC: 4 MMOL/L
RBC # BLD: 4.43 10^6/ΜL
SEDIMENTATION RATE, ERYTHROCYTE: 6
SODIUM BLD-SCNC: 142 MMOL/L
TOTAL PROTEIN: 6.8
TRIGL SERPL-MCNC: 88 MG/DL
TSH SERPL DL<=0.05 MIU/L-ACNC: 1.21 UIU/ML
VITAMIN D 25-HYDROXY: 83
VITAMIN D2, 25 HYDROXY: NORMAL
VITAMIN D3,25 HYDROXY: NORMAL
VLDLC SERPL CALC-MCNC: ABNORMAL MG/DL
WBC # BLD: 11.5 10^3/ML

## 2022-04-11 DIAGNOSIS — R53.83 FATIGUE, UNSPECIFIED TYPE: ICD-10-CM

## 2022-04-11 DIAGNOSIS — Z86.16 HISTORY OF COVID-19: ICD-10-CM

## 2022-04-11 DIAGNOSIS — E78.2 MIXED HYPERLIPIDEMIA: ICD-10-CM

## 2022-04-11 DIAGNOSIS — E55.9 VITAMIN D DEFICIENCY: ICD-10-CM

## 2022-06-09 ENCOUNTER — TELEPHONE (OUTPATIENT)
Dept: PRIMARY CARE CLINIC | Age: 62
End: 2022-06-09

## 2022-06-09 DIAGNOSIS — J01.90 ACUTE NON-RECURRENT SINUSITIS, UNSPECIFIED LOCATION: Primary | ICD-10-CM

## 2022-06-09 RX ORDER — AZITHROMYCIN 250 MG/1
250 TABLET, FILM COATED ORAL SEE ADMIN INSTRUCTIONS
Qty: 6 TABLET | Refills: 0 | Status: SHIPPED | OUTPATIENT
Start: 2022-06-09 | End: 2022-06-14

## 2022-06-09 RX ORDER — METHYLPREDNISOLONE 4 MG/1
TABLET ORAL
Qty: 1 KIT | Refills: 0 | Status: SHIPPED
Start: 2022-06-09 | End: 2022-09-28 | Stop reason: SDUPTHER

## 2022-09-28 ENCOUNTER — TELEPHONE (OUTPATIENT)
Dept: PRIMARY CARE CLINIC | Age: 62
End: 2022-09-28

## 2022-09-28 DIAGNOSIS — J45.41 MODERATE PERSISTENT ASTHMA WITH EXACERBATION: ICD-10-CM

## 2022-09-28 DIAGNOSIS — J01.90 ACUTE NON-RECURRENT SINUSITIS, UNSPECIFIED LOCATION: Primary | ICD-10-CM

## 2022-09-28 DIAGNOSIS — H10.33 ACUTE BACTERIAL CONJUNCTIVITIS OF BOTH EYES: ICD-10-CM

## 2022-09-28 RX ORDER — OFLOXACIN 3 MG/ML
1 SOLUTION/ DROPS OPHTHALMIC 4 TIMES DAILY
Qty: 1 EACH | Refills: 0 | Status: SHIPPED | OUTPATIENT
Start: 2022-09-28 | End: 2022-10-08

## 2022-09-28 RX ORDER — METHYLPREDNISOLONE 4 MG/1
TABLET ORAL
Qty: 1 KIT | Refills: 0 | Status: SHIPPED | OUTPATIENT
Start: 2022-09-28

## 2022-09-28 RX ORDER — AZITHROMYCIN 250 MG/1
250 TABLET, FILM COATED ORAL SEE ADMIN INSTRUCTIONS
Qty: 6 TABLET | Refills: 0 | Status: SHIPPED | OUTPATIENT
Start: 2022-09-28 | End: 2022-10-03

## 2022-09-29 NOTE — TELEPHONE ENCOUNTER
Patient called in in regards to URI-like symptoms. Patient reports that he has had sinus symptoms for approximately 2 to 3 weeks. Recently has developed some chest tightness and has been using his rescue inhaler more often. Some mild shortness of breath and wheezing. Patient has still been able to do normal activities. Patient reports congestion, rhinorrhea, cough at times, mucus production, headache. Patient also reports issues with dry eye. Has been using OTC medications/eyedrops for such. Does have white to slightly colored mucus surrounding his eyes in the morning. Patient has not been using CPAP or BiPAP. Patient will be prescribed azithromycin, Medrol Dosepak, and ofloxacin eyedrops. Side effects reviewed. All questions and concerns answered. Patient to call with any issues or concerns. To walk-in clinic or follow-up with myself with any new/worsening issues.

## 2022-10-03 RX ORDER — BUDESONIDE AND FORMOTEROL FUMARATE DIHYDRATE 160; 4.5 UG/1; UG/1
AEROSOL RESPIRATORY (INHALATION)
Qty: 40.8 G | Refills: 1 | Status: SHIPPED | OUTPATIENT
Start: 2022-10-03

## 2022-10-20 DIAGNOSIS — J45.41 MODERATE PERSISTENT ASTHMA WITH EXACERBATION: ICD-10-CM

## 2022-10-20 DIAGNOSIS — U07.1 COVID-19: Primary | ICD-10-CM

## 2022-10-20 RX ORDER — AZELASTINE 1 MG/ML
1 SPRAY, METERED NASAL 2 TIMES DAILY
Qty: 30 ML | Refills: 0 | Status: SHIPPED | OUTPATIENT
Start: 2022-10-20

## 2022-10-20 NOTE — PROGRESS NOTES
Phone call received from the patient in regards to a positive COVID-19 test at home. Patient's symptoms include URI symptoms, cough, body aches, chills, low-grade fevers. Mild nausea. Patient did have COVID-19 several years ago. Patient had a moderate to severe course previously and had a long recovery period. Given the patient's ongoing symptoms/breakthrough infection we will treat him with over-the-counter supplements/medications, continue use of Symbicort with as needed use of albuterol, azelastine nasal spray for mucus production, and Paxlovid. Side effects reviewed. All questions and concerns answered. With any significantly worsening symptoms patient to proceed to urgent care or ER.

## 2022-10-26 RX ORDER — PANTOPRAZOLE SODIUM 40 MG/1
40 TABLET, DELAYED RELEASE ORAL DAILY
Qty: 90 TABLET | Refills: 1 | Status: SHIPPED | OUTPATIENT
Start: 2022-10-26

## 2022-12-08 DIAGNOSIS — K21.9 GASTROESOPHAGEAL REFLUX DISEASE, UNSPECIFIED WHETHER ESOPHAGITIS PRESENT: ICD-10-CM

## 2022-12-08 DIAGNOSIS — R10.84 ABDOMINAL PAIN, GENERALIZED: ICD-10-CM

## 2022-12-08 DIAGNOSIS — J45.40 MODERATE PERSISTENT ASTHMA WITHOUT COMPLICATION: ICD-10-CM

## 2022-12-08 DIAGNOSIS — Z12.5 PROSTATE CANCER SCREENING: ICD-10-CM

## 2022-12-08 DIAGNOSIS — E78.2 MIXED HYPERLIPIDEMIA: ICD-10-CM

## 2022-12-08 DIAGNOSIS — R53.83 FATIGUE, UNSPECIFIED TYPE: ICD-10-CM

## 2022-12-08 DIAGNOSIS — E55.9 VITAMIN D DEFICIENCY: Primary | ICD-10-CM

## 2022-12-08 DIAGNOSIS — Z86.19 HISTORY OF HELICOBACTER PYLORI INFECTION: Primary | ICD-10-CM

## 2022-12-08 RX ORDER — SUCRALFATE ORAL 1 G/10ML
1 SUSPENSION ORAL 4 TIMES DAILY
Qty: 1200 ML | Refills: 3 | Status: SHIPPED | OUTPATIENT
Start: 2022-12-08

## 2022-12-08 NOTE — PROGRESS NOTES
Patient reached out with worsening epigastric pain/tenderness. Has increased his Protonix to 40 mg twice daily and is also taking Pepcid. Patient reports his symptoms seem similar to when he recently in the past had H. pylori. We will update labs. We will send prescription for Carafate. Patient is to come off of his Protonix for a few days and complete H. pylori stool testing. Patient has scheduled appointment in approximately 1 week with gastroenterology.

## 2022-12-10 LAB
ALBUMIN SERPL-MCNC: 4.3 G/DL
ALP BLD-CCNC: 66 U/L
ALT SERPL-CCNC: 26 U/L
ANION GAP SERPL CALCULATED.3IONS-SCNC: NORMAL MMOL/L
AST SERPL-CCNC: 20 U/L
BASOPHILS ABSOLUTE: 41 /ΜL
BASOPHILS RELATIVE PERCENT: 0.47 %
BILIRUB SERPL-MCNC: 0.6 MG/DL (ref 0.1–1.4)
BUN BLDV-MCNC: 18 MG/DL
CALCIUM SERPL-MCNC: 9 MG/DL
CHLORIDE BLD-SCNC: 103 MMOL/L
CHOLESTEROL, TOTAL: 171 MG/DL
CHOLESTEROL/HDL RATIO: 5.2
CO2: 27 MMOL/L
CREAT SERPL-MCNC: 1.04 MG/DL
EOSINOPHILS ABSOLUTE: 238 /ΜL
EOSINOPHILS RELATIVE PERCENT: 2.9 %
GFR CALCULATED: 80
GLUCOSE BLD-MCNC: 91 MG/DL
HCT VFR BLD CALC: 40.8 % (ref 41–53)
HDLC SERPL-MCNC: 33 MG/DL (ref 35–70)
HEMOGLOBIN: 14 G/DL (ref 13.5–17.5)
LDL CHOLESTEROL CALCULATED: 106 MG/DL (ref 0–160)
LIPASE: 43 UNITS/L
LYMPHOCYTES ABSOLUTE: 4248 /ΜL
LYMPHOCYTES RELATIVE PERCENT: 51.8 %
MCH RBC QN AUTO: 31.7 PG
MCHC RBC AUTO-ENTMCNC: 34.3 G/DL
MCV RBC AUTO: 92.3 FL
MONOCYTES ABSOLUTE: 697 /ΜL
MONOCYTES RELATIVE PERCENT: 8.5 %
NEUTROPHILS ABSOLUTE: 2977 /ΜL
NEUTROPHILS RELATIVE PERCENT: 36.3 %
NONHDLC SERPL-MCNC: 138 MG/DL
PDW BLD-RTO: 12.2 %
PLATELET # BLD: 184 K/ΜL
PMV BLD AUTO: 10.3 FL
POTASSIUM SERPL-SCNC: 4.4 MMOL/L
PROSTATE SPECIFIC ANTIGEN: 0.47 NG/ML
RBC # BLD: 4.42 10^6/ΜL
SODIUM BLD-SCNC: 139 MMOL/L
TOTAL PROTEIN: 6.8
TRIGL SERPL-MCNC: 205 MG/DL
TSH SERPL DL<=0.05 MIU/L-ACNC: 1.47 UIU/ML
VITAMIN D 25-HYDROXY: 39
VITAMIN D2, 25 HYDROXY: NORMAL
VITAMIN D3,25 HYDROXY: NORMAL
VLDLC SERPL CALC-MCNC: ABNORMAL MG/DL
WBC # BLD: 8.2 10^3/ML

## 2022-12-12 DIAGNOSIS — Z12.5 PROSTATE CANCER SCREENING: ICD-10-CM

## 2022-12-12 DIAGNOSIS — R53.83 FATIGUE, UNSPECIFIED TYPE: ICD-10-CM

## 2022-12-12 DIAGNOSIS — K21.9 GASTROESOPHAGEAL REFLUX DISEASE, UNSPECIFIED WHETHER ESOPHAGITIS PRESENT: ICD-10-CM

## 2022-12-12 DIAGNOSIS — E78.2 MIXED HYPERLIPIDEMIA: ICD-10-CM

## 2022-12-12 DIAGNOSIS — R10.84 ABDOMINAL PAIN, GENERALIZED: ICD-10-CM

## 2022-12-12 DIAGNOSIS — Z86.19 HISTORY OF HELICOBACTER PYLORI INFECTION: ICD-10-CM

## 2022-12-12 DIAGNOSIS — E55.9 VITAMIN D DEFICIENCY: ICD-10-CM

## 2022-12-12 DIAGNOSIS — R10.13 EPIGASTRIC ABDOMINAL PAIN: Primary | ICD-10-CM

## 2022-12-12 RX ORDER — SUCRALFATE ORAL 1 G/10ML
1 SUSPENSION ORAL 4 TIMES DAILY PRN
Qty: 1200 ML | Refills: 0 | Status: SHIPPED | OUTPATIENT
Start: 2022-12-12 | End: 2023-01-11

## 2023-02-10 DIAGNOSIS — J45.41 MODERATE PERSISTENT ASTHMA WITH ACUTE EXACERBATION: ICD-10-CM

## 2023-02-13 RX ORDER — ALBUTEROL SULFATE 90 UG/1
2 AEROSOL, METERED RESPIRATORY (INHALATION) EVERY 6 HOURS PRN
Qty: 13.4 G | Refills: 1 | Status: SHIPPED | OUTPATIENT
Start: 2023-02-13

## 2023-02-26 ENCOUNTER — TELEPHONE (OUTPATIENT)
Dept: PRIMARY CARE CLINIC | Age: 63
End: 2023-02-26

## 2023-02-26 DIAGNOSIS — H01.009 ACUTE BLEPHARITIS: Primary | ICD-10-CM

## 2023-02-26 DIAGNOSIS — H04.129 DRY EYE: ICD-10-CM

## 2023-02-26 RX ORDER — ERYTHROMYCIN 5 MG/G
OINTMENT OPHTHALMIC
Qty: 7 G | Refills: 0 | Status: SHIPPED | OUTPATIENT
Start: 2023-02-26 | End: 2023-03-08

## 2023-02-26 RX ORDER — METHYLPREDNISOLONE 4 MG/1
TABLET ORAL
Qty: 1 KIT | Refills: 0 | Status: SHIPPED | OUTPATIENT
Start: 2023-02-26

## 2023-02-27 NOTE — TELEPHONE ENCOUNTER
Eye irritation after exposure to hair product. Eyelids irritated. Dry eye. Tried ofloxacin without benefits. Patient advised to see Ophtho and perform trial of oral steroids and topical abx.

## 2023-04-04 ENCOUNTER — TELEPHONE (OUTPATIENT)
Dept: PRIMARY CARE CLINIC | Age: 63
End: 2023-04-04

## 2023-04-04 DIAGNOSIS — J01.90 ACUTE NON-RECURRENT SINUSITIS, UNSPECIFIED LOCATION: Primary | ICD-10-CM

## 2023-04-04 DIAGNOSIS — J45.41 MODERATE PERSISTENT ASTHMA WITH EXACERBATION: ICD-10-CM

## 2023-04-04 DIAGNOSIS — M54.50 CHRONIC BILATERAL LOW BACK PAIN, UNSPECIFIED WHETHER SCIATICA PRESENT: ICD-10-CM

## 2023-04-04 DIAGNOSIS — G89.29 CHRONIC BILATERAL LOW BACK PAIN, UNSPECIFIED WHETHER SCIATICA PRESENT: ICD-10-CM

## 2023-04-04 RX ORDER — METHYLPREDNISOLONE 4 MG/1
TABLET ORAL
Qty: 1 KIT | Refills: 0 | Status: SHIPPED | OUTPATIENT
Start: 2023-04-04

## 2023-04-05 RX ORDER — TRAMADOL HYDROCHLORIDE 50 MG/1
25-50 TABLET ORAL EVERY 8 HOURS PRN
Qty: 9 TABLET | Refills: 0 | Status: SHIPPED | OUTPATIENT
Start: 2023-04-05 | End: 2023-04-08

## 2023-04-06 NOTE — TELEPHONE ENCOUNTER
Patient called in regards to back pain. Pain similar to previous with some radicular symptoms down his legs on both sides at times. No other red flag symptoms. Patient tried OTC medications without benefit. In the past patient has done well with steroid taper and Ultram for breakthrough pain. We will refill these medications. PDMP reviewed and appropriate. Patient advised to follow-up as needed or with any new/worsening symptoms with in a urgent care or ER.

## 2023-07-07 ENCOUNTER — TELEPHONE (OUTPATIENT)
Dept: ADMINISTRATIVE | Age: 63
End: 2023-07-07

## 2023-07-19 LAB
PSA SERPL-MCNC: 2.06 NG/ML (ref 0–4)
TROPONIN I SERPL HS-MCNC: 23 NG/L (ref 0–11)

## 2023-07-20 ENCOUNTER — OFFICE VISIT (OUTPATIENT)
Dept: CARDIOLOGY CLINIC | Age: 63
End: 2023-07-20
Payer: COMMERCIAL

## 2023-07-20 VITALS
DIASTOLIC BLOOD PRESSURE: 72 MMHG | SYSTOLIC BLOOD PRESSURE: 110 MMHG | HEIGHT: 73 IN | WEIGHT: 224.1 LBS | BODY MASS INDEX: 29.7 KG/M2 | HEART RATE: 65 BPM | RESPIRATION RATE: 16 BRPM

## 2023-07-20 DIAGNOSIS — R06.09 DOE (DYSPNEA ON EXERTION): ICD-10-CM

## 2023-07-20 DIAGNOSIS — R77.8 ELEVATED TROPONIN: ICD-10-CM

## 2023-07-20 DIAGNOSIS — R07.9 CHEST PAIN, UNSPECIFIED TYPE: Primary | ICD-10-CM

## 2023-07-20 PROCEDURE — 99204 OFFICE O/P NEW MOD 45 MIN: CPT | Performed by: INTERNAL MEDICINE

## 2023-07-20 PROCEDURE — 93000 ELECTROCARDIOGRAM COMPLETE: CPT | Performed by: INTERNAL MEDICINE

## 2023-07-20 RX ORDER — FLUTICASONE FUROATE, UMECLIDINIUM BROMIDE AND VILANTEROL TRIFENATATE 200; 62.5; 25 UG/1; UG/1; UG/1
POWDER RESPIRATORY (INHALATION)
COMMUNITY
Start: 2023-06-30

## 2023-07-20 RX ORDER — OMEPRAZOLE 40 MG/1
CAPSULE, DELAYED RELEASE ORAL DAILY
COMMUNITY
Start: 2023-07-03

## 2023-07-20 RX ORDER — TIOTROPIUM BROMIDE INHALATION SPRAY 1.56 UG/1
SPRAY, METERED RESPIRATORY (INHALATION)
COMMUNITY
Start: 2023-06-30

## 2023-07-20 NOTE — PROGRESS NOTES
CREATININE 1.1 2023    BUN 16 2023     2023    K 4.7 2023     2023    CO2 26 2023     No results found for: MG  Lab Results   Component Value Date    WBC 9.3 2023    HGB 14.4 2023    HCT 44.2 2023    MCV 96.1 2023     2023     Lab Results   Component Value Date    ALT 24 2023    AST 21 2023    ALKPHOS 68 2023    BILITOT 0.3 2023     Lab Results   Component Value Date    TROPONINI <0.01 2020     No results found for: INR, PROTIME  Lab Results   Component Value Date    TSH 1.47 12/10/2022     No results found for: LABA1C  No results found for: EAG  Lab Results   Component Value Date    CHOL 183 2023    CHOL 171 12/10/2022    CHOL 176 2022     Lab Results   Component Value Date    TRIG 106 2023    TRIG 205 12/10/2022    TRIG 88 2022     Lab Results   Component Value Date    HDL 36 2023    HDL 33 (A) 12/10/2022    HDL 38 (L) 2022     Lab Results   Component Value Date    LDLCALC 126 (H) 2023    LDLCALC 106 12/10/2022    LDLCALC 119 (H) 2022     Lab Results   Component Value Date    LABVLDL 21 2023    LABVLDL 23 2021     Lab Results   Component Value Date    CHOLHDLRATIO 5.2 12/10/2022    CHOLHDLRATIO 4.6 2022    CHOLHDLRATIO 5.4 2021     No results for input(s): PROBNP in the last 72 hours. Cardiac Tests:  EC2023: Sinus rhythm 65 bpm.  Normal axis and intervals. No ST or T wave changes. Echocardiogram:     Stress test:      Cardiac catheterization:     48-hour Holter 2021  Baseline rhythm was normal sinus. Rare isolated PACs and PVCs were observed. Very rare atrial couplets and atrial triplets were noted. No VT or PAF. No significant pause or AV block. No symptoms or triggered events reported.     Orders Placed This Encounter   Procedures    Cardiac Stress Test Exercise - Treadmill    EKG 12 lead    ECHO

## 2023-07-21 ENCOUNTER — TELEPHONE (OUTPATIENT)
Dept: CARDIOLOGY | Age: 63
End: 2023-07-21

## 2023-07-21 NOTE — TELEPHONE ENCOUNTER
Called and scheduled patient for Stress test 7/24 @ 11:30a. Patient wanted to wait to schedule Echo until talking to PCP about potential sooner appointments in other offices.  Patient will call at a later date for Echo.  7/21/2023 @ 2:10a  RENETTA

## 2023-07-21 NOTE — TELEPHONE ENCOUNTER
Spoke to the patient on the phone. Reminded of his 1130 am stress test at [facility]. Cardiology and where to report. He was instructed that he may have a light breakfast and meds with water but to avoid caffeine for 12 hours. He verbalized an understanding.

## 2023-07-24 ENCOUNTER — HOSPITAL ENCOUNTER (OUTPATIENT)
Dept: CARDIOLOGY | Age: 63
Discharge: HOME | End: 2023-07-24
Payer: COMMERCIAL

## 2023-07-24 VITALS
DIASTOLIC BLOOD PRESSURE: 60 MMHG | WEIGHT: 224 LBS | RESPIRATION RATE: 16 BRPM | BODY MASS INDEX: 29.69 KG/M2 | HEIGHT: 73 IN | SYSTOLIC BLOOD PRESSURE: 90 MMHG | HEART RATE: 63 BPM

## 2023-07-24 DIAGNOSIS — R06.09 DOE (DYSPNEA ON EXERTION): ICD-10-CM

## 2023-07-24 DIAGNOSIS — R07.9 CHEST PAIN, UNSPECIFIED TYPE: ICD-10-CM

## 2023-07-24 PROCEDURE — 93017 CV STRESS TEST TRACING ONLY: CPT

## 2023-08-02 ENCOUNTER — TELEPHONE (OUTPATIENT)
Dept: CARDIOLOGY | Age: 63
End: 2023-08-02

## 2023-08-02 NOTE — TELEPHONE ENCOUNTER
Called and LM with patient to confirm if echo was completed and/or if still needed to be completed. 8/2/2023 @ 1:50p  JULIANO Charles

## 2023-08-03 ENCOUNTER — TELEPHONE (OUTPATIENT)
Dept: CARDIOLOGY | Age: 63
End: 2023-08-03

## 2023-08-03 NOTE — TELEPHONE ENCOUNTER
Contacted patient about scheduling echo. Coordinated and came to understanding that patient is confirming potential sooner slot at another facility before scheduling with [facility]. Will touch base with patient Friday 8/4 and/or Monday 8/7 to officially schedule patient and start authorization process with insurance.      John 8/3/2023 @ 3:14p

## 2023-08-04 ENCOUNTER — TELEPHONE (OUTPATIENT)
Dept: CARDIOLOGY CLINIC | Age: 63
End: 2023-08-04

## 2023-08-04 NOTE — TELEPHONE ENCOUNTER
Left message for patient to contact office. ----- Message from Efren Dueñas MD sent at 8/3/2023  8:11 PM EDT -----  Stress test looks good. No evidence of blockages. Will await echocardiogram.  How are symptoms?

## 2023-08-08 ENCOUNTER — TELEPHONE (OUTPATIENT)
Dept: CARDIOLOGY | Age: 63
End: 2023-08-08

## 2023-08-08 NOTE — TELEPHONE ENCOUNTER
Called patient and LM to confirm whether patient needs scheduled for Echo with  [facility] in [facility]. 9:34a 8/8/2023  JULIANO Jacobsen

## 2023-08-18 NOTE — TELEPHONE ENCOUNTER
Contacted patient with stress results per Dr. Kyara Anderson. Patient states he had his echo done at [facility] today and they will forward the results to us. States he feels ok. PCP gave him a new inhaler, but he doesn't like how that makes him feel. Will discuss this further with him.

## 2023-10-27 LAB
BASOPHILS # BLD: 0 K/UL (ref 0–0.2)
BASOPHILS NFR BLD: 0 % (ref 0–2)
EOSINOPHIL # BLD: 0.25 K/UL (ref 0.05–0.5)
EOSINOPHILS RELATIVE PERCENT: 3 % (ref 0–6)
ERYTHROCYTE [DISTWIDTH] IN BLOOD BY AUTOMATED COUNT: 12.2 % (ref 11.5–15)
ERYTHROCYTE [SEDIMENTATION RATE] IN BLOOD BY WESTERGREN METHOD: 5 MM/HR (ref 0–15)
HCT VFR BLD AUTO: 43.9 % (ref 37–54)
HGB BLD-MCNC: 14.5 G/DL (ref 12.5–16.5)
LDH SERPL-CCNC: 176 U/L (ref 135–225)
LYMPHOCYTES NFR BLD: 4.29 K/UL (ref 1.5–4)
LYMPHOCYTES RELATIVE PERCENT: 45 % (ref 20–42)
MCH RBC QN AUTO: 30.9 PG (ref 26–35)
MCHC RBC AUTO-ENTMCNC: 33 G/DL (ref 32–34.5)
MCV RBC AUTO: 93.6 FL (ref 80–99.9)
MONOCYTES NFR BLD: 0.17 K/UL (ref 0.1–0.95)
MONOCYTES NFR BLD: 2 % (ref 2–12)
NEUTROPHILS NFR BLD: 51 % (ref 43–80)
NEUTS SEG NFR BLD: 4.88 K/UL (ref 1.8–7.3)
PLATELET # BLD AUTO: 158 K/UL (ref 130–450)
PMV BLD AUTO: 10.2 FL (ref 7–12)
RBC # BLD AUTO: 4.69 M/UL (ref 3.8–5.8)
RBC # BLD: ABNORMAL 10*6/UL
WBC OTHER # BLD: 9.6 K/UL (ref 4.5–11.5)

## 2023-10-30 LAB — PATH REV BLD -IMP: NORMAL

## 2023-11-03 LAB
IMM RETICS NFR: 10.1 % (ref 2.3–13.4)
LDH SERPL-CCNC: 228 U/L (ref 135–225)
RETIC HEMOGLOBIN: 35.6 PG (ref 28.2–36.6)
RETICS # AUTO: 0.07 M/UL
RETICS/RBC NFR AUTO: 1.5 % (ref 0.4–1.9)

## 2023-11-04 LAB
ATYPICAL LYMPHOCYTE ABSOLUTE COUNT: 3.78 K/UL (ref 0–0.46)
ATYPICAL LYMPHOCYTES: 37 % (ref 0–4)
BASOPHILS # BLD: 0.18 K/UL (ref 0–0.2)
BASOPHILS NFR BLD: 2 % (ref 0–2)
EOSINOPHIL # BLD: 0.09 K/UL (ref 0.05–0.5)
EOSINOPHILS RELATIVE PERCENT: 1 % (ref 0–6)
ERYTHROCYTE [DISTWIDTH] IN BLOOD BY AUTOMATED COUNT: 12.5 % (ref 11.5–15)
HCT VFR BLD AUTO: 43.4 % (ref 37–54)
HGB BLD-MCNC: 14.2 G/DL (ref 12.5–16.5)
LYMPHOCYTES NFR BLD: 1.58 K/UL (ref 1.5–4)
LYMPHOCYTES RELATIVE PERCENT: 16 % (ref 20–42)
MCH RBC QN AUTO: 31.4 PG (ref 26–35)
MCHC RBC AUTO-ENTMCNC: 32.7 G/DL (ref 32–34.5)
MCV RBC AUTO: 96 FL (ref 80–99.9)
MONOCYTES NFR BLD: 0.79 K/UL (ref 0.1–0.95)
MONOCYTES NFR BLD: 8 % (ref 2–12)
MYELOCYTES ABSOLUTE COUNT: 0.18 K/UL
MYELOCYTES: 2 %
NEUTROPHILS NFR BLD: 35 % (ref 43–80)
NEUTS SEG NFR BLD: 3.51 K/UL (ref 1.8–7.3)
PLATELET CONFIRMATION: NORMAL
PLATELET, FLUORESCENCE: 176 K/UL (ref 130–450)
PMV BLD AUTO: 10.4 FL (ref 7–12)
RBC # BLD AUTO: 4.52 M/UL (ref 3.8–5.8)
RBC # BLD: ABNORMAL 10*6/UL
RBC # BLD: ABNORMAL 10*6/UL
WBC OTHER # BLD: 10.1 K/UL (ref 4.5–11.5)

## 2023-11-06 LAB — PATH REV BLD -IMP: NORMAL

## 2024-07-27 LAB
MICROORGANISM SPEC CULT: NO GROWTH
SPECIMEN DESCRIPTION: NORMAL